# Patient Record
Sex: FEMALE | Race: WHITE | Employment: FULL TIME | URBAN - METROPOLITAN AREA
[De-identification: names, ages, dates, MRNs, and addresses within clinical notes are randomized per-mention and may not be internally consistent; named-entity substitution may affect disease eponyms.]

---

## 2017-01-01 ENCOUNTER — HOSPITAL ENCOUNTER (OUTPATIENT)
Facility: HOSPITAL | Age: 52
Discharge: HOME | End: 2017-01-01
Attending: INTERNAL MEDICINE | Admitting: INTERNAL MEDICINE
Payer: COMMERCIAL

## 2017-01-01 ENCOUNTER — HOSPITAL ENCOUNTER (INPATIENT)
Facility: HOSPITAL | Age: 52
LOS: 2 days | DRG: 189 | End: 2017-01-01
Attending: HOSPITALIST | Admitting: HOSPITALIST
Payer: OTHER MISCELLANEOUS

## 2017-01-01 ENCOUNTER — TELEPHONE (OUTPATIENT)
Dept: SURGERY | Facility: CLINIC | Age: 52
End: 2017-01-01

## 2017-01-01 ENCOUNTER — APPOINTMENT (OUTPATIENT)
Dept: CV DIAGNOSTICS | Facility: HOSPITAL | Age: 52
DRG: 853 | End: 2017-01-01
Attending: HOSPITALIST
Payer: COMMERCIAL

## 2017-01-01 ENCOUNTER — APPOINTMENT (OUTPATIENT)
Dept: MRI IMAGING | Facility: HOSPITAL | Age: 52
DRG: 853 | End: 2017-01-01
Attending: HOSPITALIST
Payer: COMMERCIAL

## 2017-01-01 ENCOUNTER — APPOINTMENT (OUTPATIENT)
Dept: ULTRASOUND IMAGING | Facility: HOSPITAL | Age: 52
DRG: 853 | End: 2017-01-01
Attending: HOSPITALIST
Payer: COMMERCIAL

## 2017-01-01 ENCOUNTER — OFFICE VISIT (OUTPATIENT)
Dept: SURGERY | Facility: CLINIC | Age: 52
End: 2017-01-01

## 2017-01-01 ENCOUNTER — HOSPITAL ENCOUNTER (INPATIENT)
Facility: HOSPITAL | Age: 52
LOS: 11 days | Discharge: NURSING, REHAB OR LONG-TERM CARE | DRG: 853 | End: 2017-01-01
Attending: HOSPITALIST | Admitting: HOSPITALIST
Payer: COMMERCIAL

## 2017-01-01 ENCOUNTER — HOSPITAL ENCOUNTER (OUTPATIENT)
Dept: GENERAL RADIOLOGY | Facility: HOSPITAL | Age: 52
Discharge: HOME | End: 2017-01-01
Attending: Other
Payer: COMMERCIAL

## 2017-01-01 ENCOUNTER — APPOINTMENT (OUTPATIENT)
Dept: GENERAL RADIOLOGY | Facility: HOSPITAL | Age: 52
DRG: 853 | End: 2017-01-01
Attending: HOSPITALIST
Payer: COMMERCIAL

## 2017-01-01 ENCOUNTER — APPOINTMENT (OUTPATIENT)
Dept: CT IMAGING | Facility: HOSPITAL | Age: 52
DRG: 853 | End: 2017-01-01
Attending: HOSPITALIST
Payer: COMMERCIAL

## 2017-01-01 ENCOUNTER — OFFICE VISIT (OUTPATIENT)
Dept: HEMATOLOGY/ONCOLOGY | Facility: HOSPITAL | Age: 52
End: 2017-01-01
Attending: INTERNAL MEDICINE
Payer: COMMERCIAL

## 2017-01-01 ENCOUNTER — NURSE ONLY (OUTPATIENT)
Dept: LAB | Facility: HOSPITAL | Age: 52
End: 2017-01-01
Attending: Other
Payer: COMMERCIAL

## 2017-01-01 ENCOUNTER — APPOINTMENT (OUTPATIENT)
Dept: INTERVENTIONAL RADIOLOGY/VASCULAR | Facility: HOSPITAL | Age: 52
DRG: 853 | End: 2017-01-01
Attending: HOSPITALIST
Payer: COMMERCIAL

## 2017-01-01 ENCOUNTER — LAB ENCOUNTER (OUTPATIENT)
Dept: LAB | Facility: HOSPITAL | Age: 52
End: 2017-01-01
Attending: INTERNAL MEDICINE
Payer: COMMERCIAL

## 2017-01-01 ENCOUNTER — HOSPITAL ENCOUNTER (INPATIENT)
Facility: HOSPITAL | Age: 52
LOS: 1 days | Discharge: HOSPICE | DRG: 189 | End: 2017-01-01
Attending: EMERGENCY MEDICINE | Admitting: HOSPITALIST
Payer: COMMERCIAL

## 2017-01-01 VITALS
SYSTOLIC BLOOD PRESSURE: 107 MMHG | HEART RATE: 54 BPM | WEIGHT: 222 LBS | DIASTOLIC BLOOD PRESSURE: 67 MMHG | HEIGHT: 66 IN | BODY MASS INDEX: 35.68 KG/M2 | RESPIRATION RATE: 16 BRPM | TEMPERATURE: 98 F

## 2017-01-01 VITALS
OXYGEN SATURATION: 97 % | TEMPERATURE: 98 F | HEIGHT: 65.75 IN | BODY MASS INDEX: 35.26 KG/M2 | RESPIRATION RATE: 18 BRPM | SYSTOLIC BLOOD PRESSURE: 124 MMHG | WEIGHT: 216.81 LBS | HEART RATE: 86 BPM | DIASTOLIC BLOOD PRESSURE: 80 MMHG

## 2017-01-01 VITALS
DIASTOLIC BLOOD PRESSURE: 73 MMHG | WEIGHT: 223.5 LBS | BODY MASS INDEX: 35.92 KG/M2 | HEART RATE: 66 BPM | TEMPERATURE: 98 F | RESPIRATION RATE: 16 BRPM | OXYGEN SATURATION: 100 % | SYSTOLIC BLOOD PRESSURE: 111 MMHG | HEIGHT: 66 IN

## 2017-01-01 VITALS
SYSTOLIC BLOOD PRESSURE: 105 MMHG | RESPIRATION RATE: 22 BRPM | DIASTOLIC BLOOD PRESSURE: 66 MMHG | HEART RATE: 122 BPM | OXYGEN SATURATION: 96 % | TEMPERATURE: 101 F

## 2017-01-01 VITALS
HEART RATE: 130 BPM | SYSTOLIC BLOOD PRESSURE: 70 MMHG | RESPIRATION RATE: 20 BRPM | OXYGEN SATURATION: 97 % | DIASTOLIC BLOOD PRESSURE: 46 MMHG | TEMPERATURE: 101 F

## 2017-01-01 VITALS
WEIGHT: 220 LBS | HEART RATE: 68 BPM | SYSTOLIC BLOOD PRESSURE: 148 MMHG | DIASTOLIC BLOOD PRESSURE: 92 MMHG | RESPIRATION RATE: 18 BRPM | HEIGHT: 66 IN | BODY MASS INDEX: 35.36 KG/M2

## 2017-01-01 VITALS
OXYGEN SATURATION: 99 % | TEMPERATURE: 96 F | DIASTOLIC BLOOD PRESSURE: 74 MMHG | HEART RATE: 53 BPM | SYSTOLIC BLOOD PRESSURE: 139 MMHG | RESPIRATION RATE: 16 BRPM

## 2017-01-01 DIAGNOSIS — R90.89 ABNORMAL BRAIN MRI: ICD-10-CM

## 2017-01-01 DIAGNOSIS — G37.9 DEMYELINATING CHANGES IN BRAIN (HCC): ICD-10-CM

## 2017-01-01 DIAGNOSIS — R41.82 ALTERED MENTAL STATUS, UNSPECIFIED ALTERED MENTAL STATUS TYPE: ICD-10-CM

## 2017-01-01 DIAGNOSIS — C34.92 NON-SMALL CELL CARCINOMA OF LUNG, LEFT (HCC): ICD-10-CM

## 2017-01-01 DIAGNOSIS — R93.89 ABNORMAL MRI: Primary | ICD-10-CM

## 2017-01-01 DIAGNOSIS — F41.9 ANXIETY: ICD-10-CM

## 2017-01-01 DIAGNOSIS — R91.8 LUNG MASS: Primary | ICD-10-CM

## 2017-01-01 DIAGNOSIS — G93.9 BRAIN LESION: ICD-10-CM

## 2017-01-01 DIAGNOSIS — C34.90 PRIMARY MALIGNANT NEOPLASM OF LUNG METASTATIC TO OTHER SITE, UNSPECIFIED LATERALITY (HCC): ICD-10-CM

## 2017-01-01 DIAGNOSIS — C79.52 SECONDARY MALIGNANT NEOPLASM OF BONE AND BONE MARROW (HCC): Primary | ICD-10-CM

## 2017-01-01 DIAGNOSIS — C79.51 SECONDARY MALIGNANT NEOPLASM OF BONE AND BONE MARROW (HCC): ICD-10-CM

## 2017-01-01 DIAGNOSIS — C79.51 BONE METASTASIS (HCC): ICD-10-CM

## 2017-01-01 DIAGNOSIS — J96.90 RESPIRATORY FAILURE, UNSPECIFIED CHRONICITY, UNSPECIFIED WHETHER WITH HYPOXIA OR HYPERCAPNIA (HCC): Primary | ICD-10-CM

## 2017-01-01 DIAGNOSIS — C79.51 SECONDARY MALIGNANT NEOPLASM OF BONE AND BONE MARROW (HCC): Primary | ICD-10-CM

## 2017-01-01 DIAGNOSIS — C34.92 SMALL CELL LUNG CANCER, LEFT (HCC): ICD-10-CM

## 2017-01-01 DIAGNOSIS — C79.52 SECONDARY MALIGNANT NEOPLASM OF BONE AND BONE MARROW (HCC): ICD-10-CM

## 2017-01-01 LAB
ALBUMIN SERPL BCP-MCNC: 2 G/DL (ref 3.5–4.8)
ALBUMIN SERPL BCP-MCNC: 2 G/DL (ref 3.5–4.8)
ALBUMIN SERPL BCP-MCNC: 2.1 G/DL (ref 3.5–4.8)
ALBUMIN SERPL BCP-MCNC: 2.2 G/DL (ref 3.5–4.8)
ALBUMIN/GLOB SERPL: 1 {RATIO} (ref 1–2)
ALBUMIN/GLOB SERPL: 1 {RATIO} (ref 1–2)
ALP SERPL-CCNC: 48 U/L (ref 32–100)
ALP SERPL-CCNC: 53 U/L (ref 32–100)
ALT SERPL-CCNC: 35 U/L (ref 14–54)
ALT SERPL-CCNC: 36 U/L (ref 14–54)
ANION GAP SERPL CALC-SCNC: 3 MMOL/L (ref 0–18)
ANION GAP SERPL CALC-SCNC: 4 MMOL/L (ref 0–18)
ANION GAP SERPL CALC-SCNC: 5 MMOL/L (ref 0–18)
ANION GAP SERPL CALC-SCNC: 5 MMOL/L (ref 0–18)
ANION GAP SERPL CALC-SCNC: 6 MMOL/L (ref 0–18)
ANION GAP SERPL CALC-SCNC: 7 MMOL/L (ref 0–18)
ANION GAP SERPL CALC-SCNC: 9 MMOL/L (ref 0–18)
AST SERPL-CCNC: 25 U/L (ref 15–41)
AST SERPL-CCNC: 27 U/L (ref 15–41)
B-HCG UR QL: NEGATIVE
BASOPHILS # BLD: 0 K/UL (ref 0–0.2)
BASOPHILS NFR BLD: 0 %
BASOPHILS NFR BLD: 1 %
BILIRUB SERPL-MCNC: 0.8 MG/DL (ref 0.3–1.2)
BILIRUB SERPL-MCNC: 1.2 MG/DL (ref 0.3–1.2)
BILIRUB UR QL: NEGATIVE
BUN SERPL-MCNC: 4 MG/DL (ref 8–20)
BUN SERPL-MCNC: 40 MG/DL (ref 8–20)
BUN SERPL-MCNC: 5 MG/DL (ref 8–20)
BUN SERPL-MCNC: 6 MG/DL (ref 8–20)
BUN SERPL-MCNC: 7 MG/DL (ref 8–20)
BUN SERPL-MCNC: 9 MG/DL (ref 8–20)
BUN/CREAT SERPL: 11.9 (ref 10–20)
BUN/CREAT SERPL: 11.9 (ref 10–20)
BUN/CREAT SERPL: 12 (ref 10–20)
BUN/CREAT SERPL: 13 (ref 10–20)
BUN/CREAT SERPL: 13.6 (ref 10–20)
BUN/CREAT SERPL: 14.8 (ref 10–20)
BUN/CREAT SERPL: 21.4 (ref 10–20)
BUN/CREAT SERPL: 21.4 (ref 10–20)
BUN/CREAT SERPL: 22.6 (ref 10–20)
BUN/CREAT SERPL: 22.6 (ref 10–20)
BUN/CREAT SERPL: 8.2 (ref 10–20)
CA-I BLD-SCNC: 0.94 MMOL/L (ref 0.95–1.32)
CALCIUM SERPL-MCNC: 5.7 MG/DL (ref 8.5–10.5)
CALCIUM SERPL-MCNC: 5.8 MG/DL (ref 8.5–10.5)
CALCIUM SERPL-MCNC: 6 MG/DL (ref 8.5–10.5)
CALCIUM SERPL-MCNC: 6 MG/DL (ref 8.5–10.5)
CALCIUM SERPL-MCNC: 6.2 MG/DL (ref 8.5–10.5)
CALCIUM SERPL-MCNC: 6.3 MG/DL (ref 8.5–10.5)
CALCIUM SERPL-MCNC: 6.3 MG/DL (ref 8.5–10.5)
CALCIUM SERPL-MCNC: 6.8 MG/DL (ref 8.5–10.5)
CALCIUM SERPL-MCNC: 6.9 MG/DL (ref 8.5–10.5)
CHLORIDE SERPL-SCNC: 106 MMOL/L (ref 95–110)
CHLORIDE SERPL-SCNC: 109 MMOL/L (ref 95–110)
CHLORIDE SERPL-SCNC: 110 MMOL/L (ref 95–110)
CHLORIDE SERPL-SCNC: 111 MMOL/L (ref 95–110)
CHLORIDE SERPL-SCNC: 112 MMOL/L (ref 95–110)
CHLORIDE SERPL-SCNC: 113 MMOL/L (ref 95–110)
CHLORIDE SERPL-SCNC: 114 MMOL/L (ref 95–110)
CHOLEST SERPL-MCNC: 123 MG/DL (ref 110–200)
CLARITY CSF: CLEAR
CO2 SERPL-SCNC: 23 MMOL/L (ref 22–32)
CO2 SERPL-SCNC: 24 MMOL/L (ref 22–32)
CO2 SERPL-SCNC: 25 MMOL/L (ref 22–32)
CO2 SERPL-SCNC: 27 MMOL/L (ref 22–32)
CO2 SERPL-SCNC: 27 MMOL/L (ref 22–32)
CO2 SERPL-SCNC: 28 MMOL/L (ref 22–32)
CO2 SERPL-SCNC: 28 MMOL/L (ref 22–32)
CO2 SERPL-SCNC: 29 MMOL/L (ref 22–32)
CO2 SERPL-SCNC: 29 MMOL/L (ref 22–32)
COLOR CSF: COLORLESS
COLOR UR: YELLOW
COUNT PERFORMED ON TUBE: 4
CREAT SERPL-MCNC: 0.28 MG/DL (ref 0.5–1.5)
CREAT SERPL-MCNC: 0.28 MG/DL (ref 0.5–1.5)
CREAT SERPL-MCNC: 0.31 MG/DL (ref 0.5–1.5)
CREAT SERPL-MCNC: 0.31 MG/DL (ref 0.5–1.5)
CREAT SERPL-MCNC: 0.42 MG/DL (ref 0.5–1.5)
CREAT SERPL-MCNC: 0.44 MG/DL (ref 0.5–1.5)
CREAT SERPL-MCNC: 0.49 MG/DL (ref 0.5–1.5)
CREAT SERPL-MCNC: 0.5 MG/DL (ref 0.5–1.5)
CREAT SERPL-MCNC: 0.59 MG/DL (ref 0.5–1.5)
CREAT SERPL-MCNC: 0.69 MG/DL (ref 0.5–1.5)
CREAT SERPL-MCNC: 2.7 MG/DL (ref 0.5–1.5)
EOSINOPHIL # BLD: 0 K/UL (ref 0–0.7)
EOSINOPHIL NFR BLD: 0 %
EOSINOPHIL NFR BLD: 1 %
EOSINOPHIL NFR BLD: 1 %
ERYTHROCYTE [DISTWIDTH] IN BLOOD BY AUTOMATED COUNT: 19.4 % (ref 11–15)
ERYTHROCYTE [DISTWIDTH] IN BLOOD BY AUTOMATED COUNT: 19.4 % (ref 11–15)
ERYTHROCYTE [DISTWIDTH] IN BLOOD BY AUTOMATED COUNT: 19.5 % (ref 11–15)
ERYTHROCYTE [DISTWIDTH] IN BLOOD BY AUTOMATED COUNT: 19.7 % (ref 11–15)
ERYTHROCYTE [DISTWIDTH] IN BLOOD BY AUTOMATED COUNT: 19.8 % (ref 11–15)
ERYTHROCYTE [DISTWIDTH] IN BLOOD BY AUTOMATED COUNT: 19.9 % (ref 11–15)
ERYTHROCYTE [DISTWIDTH] IN BLOOD BY AUTOMATED COUNT: 20 % (ref 11–15)
ERYTHROCYTE [DISTWIDTH] IN BLOOD BY AUTOMATED COUNT: 20.3 % (ref 11–15)
ERYTHROCYTE [DISTWIDTH] IN BLOOD BY AUTOMATED COUNT: 20.3 % (ref 11–15)
ERYTHROCYTE [DISTWIDTH] IN BLOOD BY AUTOMATED COUNT: 20.6 % (ref 11–15)
ERYTHROCYTE [DISTWIDTH] IN BLOOD BY AUTOMATED COUNT: 21.1 % (ref 11–15)
GLOBULIN PLAS-MCNC: 2.1 G/DL (ref 2.5–3.7)
GLOBULIN PLAS-MCNC: 2.2 G/DL (ref 2.5–3.7)
GLUCOSE SERPL-MCNC: 108 MG/DL (ref 70–99)
GLUCOSE SERPL-MCNC: 111 MG/DL (ref 70–99)
GLUCOSE SERPL-MCNC: 111 MG/DL (ref 70–99)
GLUCOSE SERPL-MCNC: 120 MG/DL (ref 70–99)
GLUCOSE SERPL-MCNC: 125 MG/DL (ref 70–99)
GLUCOSE SERPL-MCNC: 153 MG/DL (ref 70–99)
GLUCOSE SERPL-MCNC: 165 MG/DL (ref 70–99)
GLUCOSE SERPL-MCNC: 82 MG/DL (ref 70–99)
GLUCOSE SERPL-MCNC: 84 MG/DL (ref 70–99)
GLUCOSE SERPL-MCNC: 86 MG/DL (ref 70–99)
GLUCOSE SERPL-MCNC: 93 MG/DL (ref 70–99)
GLUCOSE UR-MCNC: NEGATIVE MG/DL
HCT VFR BLD AUTO: 25.8 % (ref 35–48)
HCT VFR BLD AUTO: 26 % (ref 35–48)
HCT VFR BLD AUTO: 26.3 % (ref 35–48)
HCT VFR BLD AUTO: 26.4 % (ref 35–48)
HCT VFR BLD AUTO: 26.9 % (ref 35–48)
HCT VFR BLD AUTO: 27 % (ref 35–48)
HCT VFR BLD AUTO: 27.1 % (ref 35–48)
HCT VFR BLD AUTO: 27.2 % (ref 35–48)
HCT VFR BLD AUTO: 27.3 % (ref 35–48)
HCT VFR BLD AUTO: 27.8 % (ref 35–48)
HCT VFR BLD AUTO: 28.8 % (ref 35–48)
HDLC SERPL-MCNC: 36 MG/DL
HGB BLD-MCNC: 8.2 G/DL (ref 12–16)
HGB BLD-MCNC: 8.5 G/DL (ref 12–16)
HGB BLD-MCNC: 8.7 G/DL (ref 12–16)
HGB BLD-MCNC: 8.9 G/DL (ref 12–16)
HGB BLD-MCNC: 8.9 G/DL (ref 12–16)
HGB BLD-MCNC: 9 G/DL (ref 12–16)
HGB BLD-MCNC: 9 G/DL (ref 12–16)
HGB BLD-MCNC: 9.1 G/DL (ref 12–16)
HGB BLD-MCNC: 9.1 G/DL (ref 12–16)
HGB BLD-MCNC: 9.2 G/DL (ref 12–16)
HGB BLD-MCNC: 9.4 G/DL (ref 12–16)
INR BLD: 1.2 (ref 0.9–1.2)
KETONES UR-MCNC: 80 MG/DL
LACTATE SERPL-SCNC: 1.1 MMOL/L (ref 0.5–2.2)
LDLC SERPL CALC-MCNC: 75 MG/DL (ref 0–99)
LYMPHOCYTES # BLD: 0.2 K/UL (ref 1–4)
LYMPHOCYTES # BLD: 0.2 K/UL (ref 1–4)
LYMPHOCYTES # BLD: 0.3 K/UL (ref 1–4)
LYMPHOCYTES NFR BLD: 1 %
LYMPHOCYTES NFR BLD: 3 %
LYMPHOCYTES NFR BLD: 3 %
LYMPHOCYTES NFR BLD: 4 %
LYMPHOCYTES NFR BLD: 4 %
LYMPHOCYTES NFR BLD: 5 %
MAGNESIUM SERPL-MCNC: 1.7 MG/DL (ref 1.8–2.5)
MAGNESIUM SERPL-MCNC: 1.7 MG/DL (ref 1.8–2.5)
MAGNESIUM SERPL-MCNC: 1.8 MG/DL (ref 1.8–2.5)
MAGNESIUM SERPL-MCNC: 1.8 MG/DL (ref 1.8–2.5)
MAGNESIUM SERPL-MCNC: 1.9 MG/DL (ref 1.8–2.5)
MAGNESIUM SERPL-MCNC: 2 MG/DL (ref 1.8–2.5)
MAGNESIUM SERPL-MCNC: 2 MG/DL (ref 1.8–2.5)
MCH RBC QN AUTO: 29 PG (ref 27–32)
MCH RBC QN AUTO: 29 PG (ref 27–32)
MCH RBC QN AUTO: 29.2 PG (ref 27–32)
MCH RBC QN AUTO: 29.3 PG (ref 27–32)
MCH RBC QN AUTO: 29.4 PG (ref 27–32)
MCH RBC QN AUTO: 29.4 PG (ref 27–32)
MCH RBC QN AUTO: 29.5 PG (ref 27–32)
MCH RBC QN AUTO: 29.6 PG (ref 27–32)
MCH RBC QN AUTO: 29.6 PG (ref 27–32)
MCH RBC QN AUTO: 29.8 PG (ref 27–32)
MCH RBC QN AUTO: 30 PG (ref 27–32)
MCHC RBC AUTO-ENTMCNC: 31.2 G/DL (ref 32–37)
MCHC RBC AUTO-ENTMCNC: 32.6 G/DL (ref 32–37)
MCHC RBC AUTO-ENTMCNC: 32.9 G/DL (ref 32–37)
MCHC RBC AUTO-ENTMCNC: 33 G/DL (ref 32–37)
MCHC RBC AUTO-ENTMCNC: 33.2 G/DL (ref 32–37)
MCHC RBC AUTO-ENTMCNC: 33.5 G/DL (ref 32–37)
MCHC RBC AUTO-ENTMCNC: 33.8 G/DL (ref 32–37)
MCHC RBC AUTO-ENTMCNC: 33.8 G/DL (ref 32–37)
MCHC RBC AUTO-ENTMCNC: 33.9 G/DL (ref 32–37)
MCV RBC AUTO: 87 FL (ref 80–100)
MCV RBC AUTO: 87.3 FL (ref 80–100)
MCV RBC AUTO: 88.1 FL (ref 80–100)
MCV RBC AUTO: 88.2 FL (ref 80–100)
MCV RBC AUTO: 88.2 FL (ref 80–100)
MCV RBC AUTO: 88.3 FL (ref 80–100)
MCV RBC AUTO: 88.6 FL (ref 80–100)
MCV RBC AUTO: 89.6 FL (ref 80–100)
MCV RBC AUTO: 89.7 FL (ref 80–100)
MCV RBC AUTO: 89.7 FL (ref 80–100)
MCV RBC AUTO: 92.9 FL (ref 80–100)
MONOCYTES # BLD: 0.3 K/UL (ref 0–1)
MONOCYTES # BLD: 0.4 K/UL (ref 0–1)
MONOCYTES # BLD: 0.4 K/UL (ref 0–1)
MONOCYTES # BLD: 0.5 K/UL (ref 0–1)
MONOCYTES # BLD: 0.5 K/UL (ref 0–1)
MONOCYTES # BLD: 0.6 K/UL (ref 0–1)
MONOCYTES NFR BLD: 4 %
MONOCYTES NFR BLD: 5 %
MONOCYTES NFR BLD: 5 %
MONOCYTES NFR BLD: 6 %
MONOCYTES NFR BLD: 6 %
MONOCYTES NFR BLD: 7 %
MRSA DNA SPEC QL NAA+PROBE: NEGATIVE
MRSA DNA SPEC QL NAA+PROBE: NEGATIVE
NEUTROPHILS # BLD AUTO: 10.7 K/UL (ref 1.8–7.7)
NEUTROPHILS # BLD AUTO: 5.3 K/UL (ref 1.8–7.7)
NEUTROPHILS # BLD AUTO: 6.7 K/UL (ref 1.8–7.7)
NEUTROPHILS # BLD AUTO: 6.8 K/UL (ref 1.8–7.7)
NEUTROPHILS # BLD AUTO: 8.3 K/UL (ref 1.8–7.7)
NEUTROPHILS # BLD AUTO: 9.2 K/UL (ref 1.8–7.7)
NEUTROPHILS NFR BLD: 87 %
NEUTROPHILS NFR BLD: 89 %
NEUTROPHILS NFR BLD: 91 %
NEUTROPHILS NFR BLD: 91 %
NEUTROPHILS NFR BLD: 92 %
NEUTROPHILS NFR BLD: 94 %
NITRITE UR QL STRIP.AUTO: NEGATIVE
NON GYNE INTERPRETATION: NEGATIVE
NONHDLC SERPL-MCNC: 87 MG/DL
OSMOLALITY UR CALC.SUM OF ELEC: 281 MOSM/KG (ref 275–295)
OSMOLALITY UR CALC.SUM OF ELEC: 285 MOSM/KG (ref 275–295)
OSMOLALITY UR CALC.SUM OF ELEC: 290 MOSM/KG (ref 275–295)
OSMOLALITY UR CALC.SUM OF ELEC: 291 MOSM/KG (ref 275–295)
OSMOLALITY UR CALC.SUM OF ELEC: 293 MOSM/KG (ref 275–295)
OSMOLALITY UR CALC.SUM OF ELEC: 295 MOSM/KG (ref 275–295)
OSMOLALITY UR CALC.SUM OF ELEC: 296 MOSM/KG (ref 275–295)
OSMOLALITY UR CALC.SUM OF ELEC: 297 MOSM/KG (ref 275–295)
OSMOLALITY UR CALC.SUM OF ELEC: 297 MOSM/KG (ref 275–295)
OSMOLALITY UR CALC.SUM OF ELEC: 302 MOSM/KG (ref 275–295)
OSMOLALITY UR CALC.SUM OF ELEC: 317 MOSM/KG (ref 275–295)
PH UR: 6 [PH] (ref 5–8)
PLATELET # BLD AUTO: 102 K/UL (ref 140–400)
PLATELET # BLD AUTO: 58 K/UL (ref 140–400)
PLATELET # BLD AUTO: 60 K/UL (ref 140–400)
PLATELET # BLD AUTO: 64 K/UL (ref 140–400)
PLATELET # BLD AUTO: 66 K/UL (ref 140–400)
PLATELET # BLD AUTO: 66 K/UL (ref 140–400)
PLATELET # BLD AUTO: 70 K/UL (ref 140–400)
PLATELET # BLD AUTO: 75 K/UL (ref 140–400)
PMV BLD AUTO: 7.4 FL (ref 7.4–10.3)
PMV BLD AUTO: 7.6 FL (ref 7.4–10.3)
PMV BLD AUTO: 7.9 FL (ref 7.4–10.3)
PMV BLD AUTO: 8.2 FL (ref 7.4–10.3)
PMV BLD AUTO: 8.2 FL (ref 7.4–10.3)
PMV BLD AUTO: 8.6 FL (ref 7.4–10.3)
PMV BLD AUTO: 8.7 FL (ref 7.4–10.3)
PMV BLD AUTO: 9.8 FL (ref 7.4–10.3)
PMV BLD AUTO: 9.8 FL (ref 7.4–10.3)
POTASSIUM SERPL-SCNC: 2.8 MMOL/L (ref 3.3–5.1)
POTASSIUM SERPL-SCNC: 3 MMOL/L (ref 3.3–5.1)
POTASSIUM SERPL-SCNC: 3.1 MMOL/L (ref 3.3–5.1)
POTASSIUM SERPL-SCNC: 3.2 MMOL/L (ref 3.3–5.1)
POTASSIUM SERPL-SCNC: 3.2 MMOL/L (ref 3.3–5.1)
POTASSIUM SERPL-SCNC: 3.5 MMOL/L (ref 3.3–5.1)
POTASSIUM SERPL-SCNC: 3.6 MMOL/L (ref 3.3–5.1)
POTASSIUM SERPL-SCNC: 3.6 MMOL/L (ref 3.3–5.1)
POTASSIUM SERPL-SCNC: 3.7 MMOL/L (ref 3.3–5.1)
POTASSIUM SERPL-SCNC: 3.8 MMOL/L (ref 3.3–5.1)
POTASSIUM SERPL-SCNC: 4 MMOL/L (ref 3.3–5.1)
POTASSIUM SERPL-SCNC: 4.1 MMOL/L (ref 3.3–5.1)
POTASSIUM SERPL-SCNC: 4.5 MMOL/L (ref 3.3–5.1)
PROCALCITONIN SERPL-MCNC: 0.16 NG/ML (ref ?–0.11)
PROT SERPL-MCNC: 4.2 G/DL (ref 5.9–8.4)
PROT SERPL-MCNC: 4.4 G/DL (ref 5.9–8.4)
PROT UR-MCNC: NEGATIVE MG/DL
PROTHROMBIN TIME: 14.3 SECONDS (ref 11.8–14.5)
PUNCTURE CHARGE: NO
RBC # BLD AUTO: 2.83 M/UL (ref 3.7–5.4)
RBC # BLD AUTO: 2.88 M/UL (ref 3.7–5.4)
RBC # BLD AUTO: 2.94 M/UL (ref 3.7–5.4)
RBC # BLD AUTO: 3 M/UL (ref 3.7–5.4)
RBC # BLD AUTO: 3.05 M/UL (ref 3.7–5.4)
RBC # BLD AUTO: 3.05 M/UL (ref 3.7–5.4)
RBC # BLD AUTO: 3.07 M/UL (ref 3.7–5.4)
RBC # BLD AUTO: 3.08 M/UL (ref 3.7–5.4)
RBC # BLD AUTO: 3.11 M/UL (ref 3.7–5.4)
RBC # BLD AUTO: 3.15 M/UL (ref 3.7–5.4)
RBC # BLD AUTO: 3.21 M/UL (ref 3.7–5.4)
RBC #/AREA URNS AUTO: 23 /HPF
RBC CSF: 10 /MM3
SODIUM SERPL-SCNC: 137 MMOL/L (ref 136–144)
SODIUM SERPL-SCNC: 139 MMOL/L (ref 136–144)
SODIUM SERPL-SCNC: 141 MMOL/L (ref 136–144)
SODIUM SERPL-SCNC: 142 MMOL/L (ref 136–144)
SODIUM SERPL-SCNC: 143 MMOL/L (ref 136–144)
SODIUM SERPL-SCNC: 143 MMOL/L (ref 136–144)
SODIUM SERPL-SCNC: 144 MMOL/L (ref 136–144)
SODIUM SERPL-SCNC: 144 MMOL/L (ref 136–144)
SODIUM SERPL-SCNC: 145 MMOL/L (ref 136–144)
SODIUM SERPL-SCNC: 145 MMOL/L (ref 136–144)
SODIUM SERPL-SCNC: 147 MMOL/L (ref 136–144)
SP GR UR STRIP: 1.01 (ref 1–1.03)
TOTAL VOLUME CSF: 14 ML
TRIGL SERPL-MCNC: 60 MG/DL (ref 1–149)
TROPONIN I SERPL-MCNC: 0.24 NG/ML (ref ?–0.03)
TROPONIN I SERPL-MCNC: 0.35 NG/ML (ref ?–0.03)
UROBILINOGEN UR STRIP-ACNC: <2
VANCOMYCIN SERPL-MCNC: 13 MCG/ML
VANCOMYCIN TROUGH SERPL-MCNC: 10.2 MCG/ML (ref 10–20)
VANCOMYCIN TROUGH SERPL-MCNC: 10.8 MCG/ML (ref 10–20)
VANCOMYCIN TROUGH SERPL-MCNC: 19.1 MCG/ML (ref 10–20)
VANCOMYCIN TROUGH SERPL-MCNC: 35.2 MCG/ML (ref 10–20)
VANCOMYCIN TROUGH SERPL-MCNC: 39.2 MCG/ML (ref 10–20)
VIT C UR-MCNC: NEGATIVE MG/DL
WBC # BLD AUTO: 10.2 K/UL (ref 4–11)
WBC # BLD AUTO: 10.3 K/UL (ref 4–11)
WBC # BLD AUTO: 11.4 K/UL (ref 4–11)
WBC # BLD AUTO: 6.1 K/UL (ref 4–11)
WBC # BLD AUTO: 6.1 K/UL (ref 4–11)
WBC # BLD AUTO: 7.4 K/UL (ref 4–11)
WBC # BLD AUTO: 7.4 K/UL (ref 4–11)
WBC # BLD AUTO: 7.6 K/UL (ref 4–11)
WBC # BLD AUTO: 8.2 K/UL (ref 4–11)
WBC # BLD AUTO: 9 K/UL (ref 4–11)
WBC # BLD AUTO: 9.6 K/UL (ref 4–11)
WBC # FLD MANUAL: 1 /MM3 (ref 0–5)
WBC #/AREA URNS AUTO: 30 /HPF

## 2017-01-01 PROCEDURE — 71010 XR CHEST AP PORTABLE  (CPT=71010): CPT | Performed by: HOSPITALIST

## 2017-01-01 PROCEDURE — 82784 ASSAY IGA/IGD/IGG/IGM EACH: CPT

## 2017-01-01 PROCEDURE — 88108 CYTOPATH CONCENTRATE TECH: CPT | Performed by: INTERNAL MEDICINE

## 2017-01-01 PROCEDURE — 99231 SBSQ HOSP IP/OBS SF/LOW 25: CPT | Performed by: REGISTERED NURSE

## 2017-01-01 PROCEDURE — 86920 COMPATIBILITY TEST SPIN: CPT

## 2017-01-01 PROCEDURE — 99203 OFFICE O/P NEW LOW 30 MIN: CPT | Performed by: PHYSICIAN ASSISTANT

## 2017-01-01 PROCEDURE — 36415 COLL VENOUS BLD VENIPUNCTURE: CPT

## 2017-01-01 PROCEDURE — 74177 CT ABD & PELVIS W/CONTRAST: CPT | Performed by: HOSPITALIST

## 2017-01-01 PROCEDURE — 86900 BLOOD TYPING SEROLOGIC ABO: CPT | Performed by: PHYSICIAN ASSISTANT

## 2017-01-01 PROCEDURE — 99254 IP/OBS CNSLTJ NEW/EST MOD 60: CPT | Performed by: REGISTERED NURSE

## 2017-01-01 PROCEDURE — B5191ZZ FLUOROSCOPY OF INFERIOR VENA CAVA USING LOW OSMOLAR CONTRAST: ICD-10-PCS | Performed by: RADIOLOGY

## 2017-01-01 PROCEDURE — 93970 EXTREMITY STUDY: CPT | Performed by: HOSPITALIST

## 2017-01-01 PROCEDURE — 86403 PARTICLE AGGLUT ANTBDY SCRN: CPT

## 2017-01-01 PROCEDURE — 86901 BLOOD TYPING SEROLOGIC RH(D): CPT

## 2017-01-01 PROCEDURE — 99252 IP/OBS CONSLTJ NEW/EST SF 35: CPT | Performed by: REGISTERED NURSE

## 2017-01-01 PROCEDURE — 84588 ASSAY OF VASOPRESSIN: CPT | Performed by: PHYSICIAN ASSISTANT

## 2017-01-01 PROCEDURE — 86850 RBC ANTIBODY SCREEN: CPT | Performed by: PHYSICIAN ASSISTANT

## 2017-01-01 PROCEDURE — 86900 BLOOD TYPING SEROLOGIC ABO: CPT

## 2017-01-01 PROCEDURE — 99231 SBSQ HOSP IP/OBS SF/LOW 25: CPT | Performed by: NURSE PRACTITIONER

## 2017-01-01 PROCEDURE — 88185 FLOWCYTOMETRY/TC ADD-ON: CPT | Performed by: OTHER

## 2017-01-01 PROCEDURE — 93306 TTE W/DOPPLER COMPLETE: CPT | Performed by: HOSPITALIST

## 2017-01-01 PROCEDURE — 99233 SBSQ HOSP IP/OBS HIGH 50: CPT | Performed by: REGISTERED NURSE

## 2017-01-01 PROCEDURE — 83916 OLIGOCLONAL BANDS: CPT

## 2017-01-01 PROCEDURE — 30243N1 TRANSFUSION OF NONAUTOLOGOUS RED BLOOD CELLS INTO CENTRAL VEIN, PERCUTANEOUS APPROACH: ICD-10-PCS | Performed by: INTERNAL MEDICINE

## 2017-01-01 PROCEDURE — 88184 FLOWCYTOMETRY/ TC 1 MARKER: CPT | Performed by: OTHER

## 2017-01-01 PROCEDURE — 70553 MRI BRAIN STEM W/O & W/DYE: CPT | Performed by: HOSPITALIST

## 2017-01-01 PROCEDURE — 06H03DZ INSERTION OF INTRALUMINAL DEVICE INTO INFERIOR VENA CAVA, PERCUTANEOUS APPROACH: ICD-10-PCS | Performed by: RADIOLOGY

## 2017-01-01 PROCEDURE — 85025 COMPLETE CBC W/AUTO DIFF WBC: CPT

## 2017-01-01 PROCEDURE — 80053 COMPREHEN METABOLIC PANEL: CPT

## 2017-01-01 PROCEDURE — 87102 FUNGUS ISOLATION CULTURE: CPT

## 2017-01-01 PROCEDURE — 87205 SMEAR GRAM STAIN: CPT

## 2017-01-01 PROCEDURE — 99232 SBSQ HOSP IP/OBS MODERATE 35: CPT | Performed by: REGISTERED NURSE

## 2017-01-01 PROCEDURE — 62270 DX LMBR SPI PNXR: CPT | Performed by: OTHER

## 2017-01-01 PROCEDURE — 36430 TRANSFUSION BLD/BLD COMPNT: CPT

## 2017-01-01 PROCEDURE — 87070 CULTURE OTHR SPECIMN AEROBIC: CPT

## 2017-01-01 PROCEDURE — 86850 RBC ANTIBODY SCREEN: CPT

## 2017-01-01 PROCEDURE — 86901 BLOOD TYPING SEROLOGIC RH(D): CPT | Performed by: PHYSICIAN ASSISTANT

## 2017-01-01 PROCEDURE — 77003 FLUOROGUIDE FOR SPINE INJECT: CPT | Performed by: OTHER

## 2017-01-01 PROCEDURE — 89050 BODY FLUID CELL COUNT: CPT | Performed by: OTHER

## 2017-01-01 RX ORDER — POTASSIUM CHLORIDE 29.8 MG/ML
40 INJECTION INTRAVENOUS ONCE
Status: COMPLETED | OUTPATIENT
Start: 2017-01-01 | End: 2017-01-01

## 2017-01-01 RX ORDER — POLYETHYLENE GLYCOL 3350 17 G/17G
17 POWDER, FOR SOLUTION ORAL DAILY PRN
Status: DISCONTINUED | OUTPATIENT
Start: 2017-01-01 | End: 2017-01-01

## 2017-01-01 RX ORDER — MORPHINE SULFATE 2 MG/ML
2 INJECTION, SOLUTION INTRAMUSCULAR; INTRAVENOUS EVERY 2 HOUR PRN
Status: DISCONTINUED | OUTPATIENT
Start: 2017-01-01 | End: 2017-01-01

## 2017-01-01 RX ORDER — MORPHINE SULFATE IN 0.9 % NACL 1 MG/ML
1 PLASTIC BAG, INJECTION (ML) INTRAVENOUS CONTINUOUS
Status: DISCONTINUED | OUTPATIENT
Start: 2017-01-01 | End: 2017-01-01

## 2017-01-01 RX ORDER — LORAZEPAM 2 MG/ML
0.5 INJECTION INTRAMUSCULAR EVERY 4 HOURS PRN
Status: CANCELLED | OUTPATIENT
Start: 2017-01-01

## 2017-01-01 RX ORDER — MORPHINE SULFATE 100 MG/5ML
5 SOLUTION ORAL EVERY 4 HOURS PRN
Status: DISCONTINUED | OUTPATIENT
Start: 2017-01-01 | End: 2017-01-01

## 2017-01-01 RX ORDER — FUROSEMIDE 10 MG/ML
40 INJECTION INTRAMUSCULAR; INTRAVENOUS EVERY 8 HOURS PRN
Status: DISCONTINUED | OUTPATIENT
Start: 2017-01-01 | End: 2017-01-01

## 2017-01-01 RX ORDER — SODIUM CHLORIDE 0.9 % (FLUSH) 0.9 %
3 SYRINGE (ML) INJECTION AS NEEDED
Status: DISCONTINUED | OUTPATIENT
Start: 2017-01-01 | End: 2017-01-01

## 2017-01-01 RX ORDER — LORAZEPAM 2 MG/ML
1 INJECTION INTRAMUSCULAR EVERY 4 HOURS PRN
Status: DISCONTINUED | OUTPATIENT
Start: 2017-01-01 | End: 2017-01-01

## 2017-01-01 RX ORDER — NYSTATIN 100000 U/G
CREAM TOPICAL
Qty: 1 TUBE | Refills: 0 | Status: SHIPPED | OUTPATIENT
Start: 2017-01-01

## 2017-01-01 RX ORDER — LEVETIRACETAM 500 MG/1
500 TABLET ORAL 2 TIMES DAILY
Qty: 60 TABLET | Refills: 0 | Status: SHIPPED | OUTPATIENT
Start: 2017-01-01

## 2017-01-01 RX ORDER — 0.9 % SODIUM CHLORIDE 0.9 %
VIAL (ML) INJECTION
Status: DISCONTINUED
Start: 2017-01-01 | End: 2017-01-01

## 2017-01-01 RX ORDER — ACETAMINOPHEN 325 MG/1
650 TABLET ORAL EVERY 6 HOURS PRN
Qty: 1 TABLET | Refills: 0 | Status: SHIPPED | OUTPATIENT
Start: 2017-01-01

## 2017-01-01 RX ORDER — POTASSIUM CHLORIDE 29.8 MG/ML
40 INJECTION INTRAVENOUS EVERY 4 HOURS
Status: DISPENSED | OUTPATIENT
Start: 2017-01-01 | End: 2017-01-01

## 2017-01-01 RX ORDER — SODIUM CHLORIDE 0.9 % (FLUSH) 0.9 %
10 SYRINGE (ML) INJECTION AS NEEDED
Status: DISCONTINUED | OUTPATIENT
Start: 2017-01-01 | End: 2017-01-01

## 2017-01-01 RX ORDER — SCOLOPAMINE TRANSDERMAL SYSTEM 1 MG/1
1 PATCH, EXTENDED RELEASE TRANSDERMAL
Status: CANCELLED | OUTPATIENT
Start: 2017-09-03

## 2017-01-01 RX ORDER — SODIUM CHLORIDE 9 MG/ML
INJECTION, SOLUTION INTRAVENOUS
Status: COMPLETED
Start: 2017-01-01 | End: 2017-01-01

## 2017-01-01 RX ORDER — 0.9 % SODIUM CHLORIDE 0.9 %
VIAL (ML) INJECTION
Status: DISPENSED
Start: 2017-01-01 | End: 2017-01-01

## 2017-01-01 RX ORDER — MAGNESIUM SULFATE HEPTAHYDRATE 40 MG/ML
2 INJECTION, SOLUTION INTRAVENOUS ONCE
Status: COMPLETED | OUTPATIENT
Start: 2017-01-01 | End: 2017-01-01

## 2017-01-01 RX ORDER — GLYCOPYRROLATE 0.2 MG/ML
0.4 INJECTION, SOLUTION INTRAMUSCULAR; INTRAVENOUS
Status: DISCONTINUED | OUTPATIENT
Start: 2017-01-01 | End: 2017-01-01

## 2017-01-01 RX ORDER — LORAZEPAM 2 MG/ML
2 INJECTION INTRAMUSCULAR EVERY 4 HOURS PRN
Status: DISCONTINUED | OUTPATIENT
Start: 2017-01-01 | End: 2017-01-01

## 2017-01-01 RX ORDER — FUROSEMIDE 10 MG/ML
20 INJECTION INTRAMUSCULAR; INTRAVENOUS ONCE
Status: COMPLETED | OUTPATIENT
Start: 2017-01-01 | End: 2017-01-01

## 2017-01-01 RX ORDER — ACETAMINOPHEN 160 MG/5ML
650 SOLUTION ORAL EVERY 6 HOURS SCHEDULED
Status: DISCONTINUED | OUTPATIENT
Start: 2017-01-01 | End: 2017-01-01

## 2017-01-01 RX ORDER — POTASSIUM CHLORIDE 29.8 MG/ML
40 INJECTION INTRAVENOUS EVERY 4 HOURS
Status: COMPLETED | OUTPATIENT
Start: 2017-01-01 | End: 2017-01-01

## 2017-01-01 RX ORDER — HEPARIN SODIUM 5000 [USP'U]/ML
5000 INJECTION, SOLUTION INTRAVENOUS; SUBCUTANEOUS EVERY 8 HOURS SCHEDULED
Status: DISCONTINUED | OUTPATIENT
Start: 2017-01-01 | End: 2017-01-01

## 2017-01-01 RX ORDER — SODIUM CHLORIDE 9 MG/ML
INJECTION, SOLUTION INTRAVENOUS CONTINUOUS
Status: DISCONTINUED | OUTPATIENT
Start: 2017-01-01 | End: 2017-01-01

## 2017-01-01 RX ORDER — SCOLOPAMINE TRANSDERMAL SYSTEM 1 MG/1
1 PATCH, EXTENDED RELEASE TRANSDERMAL
Status: DISCONTINUED | OUTPATIENT
Start: 2017-01-01 | End: 2017-01-01

## 2017-01-01 RX ORDER — IPRATROPIUM BROMIDE AND ALBUTEROL SULFATE 2.5; .5 MG/3ML; MG/3ML
SOLUTION RESPIRATORY (INHALATION)
Status: COMPLETED
Start: 2017-01-01 | End: 2017-01-01

## 2017-01-01 RX ORDER — BISACODYL 10 MG
10 SUPPOSITORY, RECTAL RECTAL
Status: DISCONTINUED | OUTPATIENT
Start: 2017-01-01 | End: 2017-01-01

## 2017-01-01 RX ORDER — KETOROLAC TROMETHAMINE 30 MG/ML
30 INJECTION, SOLUTION INTRAMUSCULAR; INTRAVENOUS EVERY 6 HOURS PRN
Status: DISPENSED | OUTPATIENT
Start: 2017-01-01 | End: 2017-01-01

## 2017-01-01 RX ORDER — TRAMADOL HYDROCHLORIDE 50 MG/1
50 TABLET ORAL EVERY 6 HOURS PRN
Status: DISCONTINUED | OUTPATIENT
Start: 2017-01-01 | End: 2017-01-01

## 2017-01-01 RX ORDER — POTASSIUM CHLORIDE 29.8 MG/ML
40 INJECTION INTRAVENOUS ONCE
Status: DISCONTINUED | OUTPATIENT
Start: 2017-01-01 | End: 2017-01-01

## 2017-01-01 RX ORDER — MORPHINE SULFATE 15 MG/1
15 TABLET, FILM COATED, EXTENDED RELEASE ORAL EVERY 12 HOURS SCHEDULED
Qty: 30 TABLET | Refills: 0 | Status: SHIPPED | OUTPATIENT
Start: 2017-01-01

## 2017-01-01 RX ORDER — DIPHENHYDRAMINE HCL 25 MG
CAPSULE ORAL
Status: DISCONTINUED
Start: 2017-01-01 | End: 2017-01-01

## 2017-01-01 RX ORDER — DOCUSATE SODIUM 100 MG/1
100 CAPSULE, LIQUID FILLED ORAL 2 TIMES DAILY
Status: DISCONTINUED | OUTPATIENT
Start: 2017-01-01 | End: 2017-01-01

## 2017-01-01 RX ORDER — SODIUM CHLORIDE 9 MG/ML
INJECTION, SOLUTION INTRAVENOUS
Status: DISPENSED
Start: 2017-01-01 | End: 2017-01-01

## 2017-01-01 RX ORDER — SODIUM PHOSPHATE, DIBASIC AND SODIUM PHOSPHATE, MONOBASIC 7; 19 G/133ML; G/133ML
1 ENEMA RECTAL ONCE AS NEEDED
Status: DISCONTINUED | OUTPATIENT
Start: 2017-01-01 | End: 2017-01-01

## 2017-01-01 RX ORDER — NYSTATIN 100000 U/G
CREAM TOPICAL 2 TIMES DAILY
Status: DISCONTINUED | OUTPATIENT
Start: 2017-01-01 | End: 2017-01-01

## 2017-01-01 RX ORDER — DIPHENHYDRAMINE HCL 25 MG
25 CAPSULE ORAL ONCE
Status: COMPLETED | OUTPATIENT
Start: 2017-01-01 | End: 2017-01-01

## 2017-01-01 RX ORDER — ASPIRIN 325 MG
325 TABLET, DELAYED RELEASE (ENTERIC COATED) ORAL DAILY
Status: DISCONTINUED | OUTPATIENT
Start: 2017-01-01 | End: 2017-01-01

## 2017-01-01 RX ORDER — SODIUM CHLORIDE AND POTASSIUM CHLORIDE .9; .15 G/100ML; G/100ML
SOLUTION INTRAVENOUS CONTINUOUS
Status: DISCONTINUED | OUTPATIENT
Start: 2017-01-01 | End: 2017-01-01

## 2017-01-01 RX ORDER — MORPHINE SULFATE 100 MG/5ML
5 SOLUTION ORAL EVERY 4 HOURS PRN
Qty: 1 BOTTLE | Refills: 0 | Status: SHIPPED | OUTPATIENT
Start: 2017-01-01

## 2017-01-01 RX ORDER — ONDANSETRON 2 MG/ML
4 INJECTION INTRAMUSCULAR; INTRAVENOUS EVERY 6 HOURS PRN
Status: DISCONTINUED | OUTPATIENT
Start: 2017-01-01 | End: 2017-01-01

## 2017-01-01 RX ORDER — LEVOFLOXACIN 750 MG/1
750 TABLET ORAL DAILY
Qty: 7 TABLET | Refills: 0 | Status: SHIPPED | OUTPATIENT
Start: 2017-01-01 | End: 2017-01-01

## 2017-01-01 RX ORDER — SODIUM CHLORIDE 0.9 % (FLUSH) 0.9 %
10 SYRINGE (ML) INJECTION AS NEEDED
Status: CANCELLED | OUTPATIENT
Start: 2017-01-01

## 2017-01-01 RX ORDER — MORPHINE SULFATE 2 MG/ML
1 INJECTION, SOLUTION INTRAMUSCULAR; INTRAVENOUS EVERY 2 HOUR PRN
Status: DISCONTINUED | OUTPATIENT
Start: 2017-01-01 | End: 2017-01-01

## 2017-01-01 RX ORDER — HEPARIN SODIUM (PORCINE) LOCK FLUSH IV SOLN 100 UNIT/ML 100 UNIT/ML
SOLUTION INTRAVENOUS
Status: DISCONTINUED
Start: 2017-01-01 | End: 2017-01-01

## 2017-01-01 RX ORDER — LORAZEPAM 2 MG/ML
0.5 INJECTION INTRAMUSCULAR EVERY 4 HOURS PRN
Status: DISCONTINUED | OUTPATIENT
Start: 2017-01-01 | End: 2017-01-01

## 2017-01-01 RX ORDER — SENNOSIDES 8.6 MG
8.6 TABLET ORAL 2 TIMES DAILY PRN
Status: DISCONTINUED | OUTPATIENT
Start: 2017-01-01 | End: 2017-01-01

## 2017-01-01 RX ORDER — ACETAMINOPHEN 325 MG/1
650 TABLET ORAL EVERY 6 HOURS PRN
Status: DISCONTINUED | OUTPATIENT
Start: 2017-01-01 | End: 2017-01-01

## 2017-01-01 RX ORDER — LIDOCAINE HYDROCHLORIDE 20 MG/ML
INJECTION, SOLUTION EPIDURAL; INFILTRATION; INTRACAUDAL; PERINEURAL
Status: COMPLETED
Start: 2017-01-01 | End: 2017-01-01

## 2017-01-01 RX ORDER — MORPHINE SULFATE 15 MG/1
15 TABLET, FILM COATED, EXTENDED RELEASE ORAL EVERY 12 HOURS SCHEDULED
Status: DISCONTINUED | OUTPATIENT
Start: 2017-01-01 | End: 2017-01-01

## 2017-01-01 RX ORDER — MORPHINE SULFATE IN 0.9 % NACL 1 MG/ML
1 PLASTIC BAG, INJECTION (ML) INTRAVENOUS CONTINUOUS
Status: CANCELLED | OUTPATIENT
Start: 2017-01-01

## 2017-01-01 RX ORDER — MORPHINE SULFATE 4 MG/ML
4 INJECTION, SOLUTION INTRAMUSCULAR; INTRAVENOUS EVERY 2 HOUR PRN
Status: DISCONTINUED | OUTPATIENT
Start: 2017-01-01 | End: 2017-01-01

## 2017-01-01 RX ORDER — ACETAMINOPHEN 325 MG/1
650 TABLET ORAL ONCE
Status: COMPLETED | OUTPATIENT
Start: 2017-01-01 | End: 2017-01-01

## 2017-01-01 RX ORDER — TRAMADOL HYDROCHLORIDE 50 MG/1
50 TABLET ORAL EVERY 6 HOURS PRN
Qty: 40 TABLET | Refills: 0 | Status: SHIPPED | OUTPATIENT
Start: 2017-01-01 | End: 2017-01-01

## 2017-01-01 RX ORDER — ATROPINE SULFATE 10 MG/ML
2 SOLUTION/ DROPS OPHTHALMIC EVERY 2 HOUR PRN
Status: CANCELLED | OUTPATIENT
Start: 2017-01-01

## 2017-01-01 RX ORDER — GLYCOPYRROLATE 0.2 MG/ML
0.4 INJECTION, SOLUTION INTRAMUSCULAR; INTRAVENOUS
Status: CANCELLED | OUTPATIENT
Start: 2017-01-01

## 2017-01-01 RX ORDER — SODIUM CHLORIDE 9 MG/ML
INJECTION, SOLUTION INTRAVENOUS ONCE
Status: DISCONTINUED | OUTPATIENT
Start: 2017-01-01 | End: 2017-01-01

## 2017-01-01 RX ORDER — LORAZEPAM 2 MG/ML
2 INJECTION INTRAMUSCULAR EVERY 4 HOURS PRN
Status: CANCELLED | OUTPATIENT
Start: 2017-01-01

## 2017-01-01 RX ORDER — MORPHINE SULFATE 2 MG/ML
2 INJECTION, SOLUTION INTRAMUSCULAR; INTRAVENOUS
Status: DISCONTINUED | OUTPATIENT
Start: 2017-01-01 | End: 2017-01-01

## 2017-01-01 RX ORDER — POTASSIUM CHLORIDE 14.9 MG/ML
20 INJECTION INTRAVENOUS ONCE
Status: COMPLETED | OUTPATIENT
Start: 2017-01-01 | End: 2017-01-01

## 2017-01-01 RX ORDER — LIDOCAINE AND PRILOCAINE 25; 25 MG/G; MG/G
CREAM TOPICAL ONCE
Status: DISCONTINUED | OUTPATIENT
Start: 2017-01-01 | End: 2017-01-01

## 2017-01-01 RX ORDER — ALPRAZOLAM 0.5 MG/1
0.5 TABLET ORAL EVERY 4 HOURS PRN
Qty: 20 TABLET | Refills: 0 | Status: SHIPPED | OUTPATIENT
Start: 2017-01-01

## 2017-01-01 RX ORDER — MORPHINE SULFATE 2 MG/ML
2 INJECTION, SOLUTION INTRAMUSCULAR; INTRAVENOUS ONCE
Status: COMPLETED | OUTPATIENT
Start: 2017-01-01 | End: 2017-01-01

## 2017-01-01 RX ORDER — FUROSEMIDE 40 MG/1
40 TABLET ORAL EVERY 8 HOURS PRN
Status: DISCONTINUED | OUTPATIENT
Start: 2017-01-01 | End: 2017-01-01

## 2017-01-01 RX ORDER — ATROPINE SULFATE 10 MG/ML
2 SOLUTION/ DROPS OPHTHALMIC EVERY 2 HOUR PRN
Status: DISCONTINUED | OUTPATIENT
Start: 2017-01-01 | End: 2017-01-01

## 2017-01-01 RX ORDER — SCOLOPAMINE TRANSDERMAL SYSTEM 1 MG/1
1 PATCH, EXTENDED RELEASE TRANSDERMAL
Status: DISCONTINUED | OUTPATIENT
Start: 2017-09-03 | End: 2017-01-01

## 2017-01-01 RX ORDER — MORPHINE SULFATE 2 MG/ML
2 INJECTION, SOLUTION INTRAMUSCULAR; INTRAVENOUS
Status: CANCELLED | OUTPATIENT
Start: 2017-01-01

## 2017-01-01 RX ORDER — SODIUM CHLORIDE 9 MG/ML
INJECTION, SOLUTION INTRAVENOUS ONCE
Status: COMPLETED | OUTPATIENT
Start: 2017-01-01 | End: 2017-01-01

## 2017-01-01 RX ORDER — ACETAMINOPHEN 325 MG/1
TABLET ORAL
Status: DISCONTINUED
Start: 2017-01-01 | End: 2017-01-01

## 2017-01-01 RX ORDER — MIDAZOLAM HYDROCHLORIDE 1 MG/ML
INJECTION INTRAMUSCULAR; INTRAVENOUS
Status: DISCONTINUED
Start: 2017-01-01 | End: 2017-01-01 | Stop reason: WASHOUT

## 2017-01-01 RX ORDER — METOPROLOL TARTRATE 5 MG/5ML
5 INJECTION INTRAVENOUS EVERY 6 HOURS
Status: DISCONTINUED | OUTPATIENT
Start: 2017-01-01 | End: 2017-01-01

## 2017-01-01 RX ORDER — FAMOTIDINE 10 MG/ML
20 INJECTION, SOLUTION INTRAVENOUS 2 TIMES DAILY
Status: DISCONTINUED | OUTPATIENT
Start: 2017-01-01 | End: 2017-01-01

## 2017-01-01 RX ORDER — HEPARIN SODIUM 5000 [USP'U]/ML
5000 INJECTION, SOLUTION INTRAVENOUS; SUBCUTANEOUS EVERY 12 HOURS SCHEDULED
Status: DISCONTINUED | OUTPATIENT
Start: 2017-01-01 | End: 2017-01-01

## 2017-01-01 RX ORDER — SODIUM CHLORIDE 9 MG/ML
INJECTION, SOLUTION INTRAVENOUS CONTINUOUS
OUTPATIENT
Start: 2017-01-01

## 2017-01-01 RX ORDER — ACETAMINOPHEN 650 MG/1
650 SUPPOSITORY RECTAL EVERY 6 HOURS SCHEDULED
Status: DISCONTINUED | OUTPATIENT
Start: 2017-01-01 | End: 2017-01-01

## 2017-01-01 RX ORDER — 0.9 % SODIUM CHLORIDE 0.9 %
VIAL (ML) INJECTION
Status: COMPLETED
Start: 2017-01-01 | End: 2017-01-01

## 2017-01-01 RX ORDER — METOPROLOL SUCCINATE 25 MG/1
25 TABLET, EXTENDED RELEASE ORAL DAILY
Qty: 60 TABLET | Refills: 0 | Status: SHIPPED | OUTPATIENT
Start: 2017-01-01

## 2017-01-01 RX ORDER — SENNA PLUS 8.6 MG/1
8.6 TABLET ORAL 2 TIMES DAILY PRN
Qty: 20 TABLET | Refills: 0 | Status: SHIPPED | OUTPATIENT
Start: 2017-01-01

## 2017-01-01 RX ORDER — IPRATROPIUM BROMIDE AND ALBUTEROL SULFATE 2.5; .5 MG/3ML; MG/3ML
3 SOLUTION RESPIRATORY (INHALATION) EVERY 6 HOURS PRN
Status: DISCONTINUED | OUTPATIENT
Start: 2017-01-01 | End: 2017-01-01

## 2017-01-01 RX ORDER — ALPRAZOLAM 0.5 MG/1
0.5 TABLET ORAL 3 TIMES DAILY PRN
Status: DISCONTINUED | OUTPATIENT
Start: 2017-01-01 | End: 2017-01-01

## 2017-01-01 RX ORDER — LORAZEPAM 2 MG/ML
1 INJECTION INTRAMUSCULAR EVERY 4 HOURS PRN
Status: CANCELLED | OUTPATIENT
Start: 2017-01-01

## 2017-01-01 RX ORDER — SODIUM CHLORIDE 9 MG/ML
INJECTION, SOLUTION INTRAVENOUS
Status: DISCONTINUED
Start: 2017-01-01 | End: 2017-01-01

## 2017-01-01 RX ADMIN — DIPHENHYDRAMINE HCL 25 MG: 25 MG CAPSULE ORAL at 09:49:00

## 2017-01-01 RX ADMIN — ACETAMINOPHEN 650 MG: 325 TABLET ORAL at 09:49:00

## 2017-01-09 PROBLEM — R05.9 COUGH: Status: ACTIVE | Noted: 2017-01-01

## 2017-03-03 PROBLEM — D61.2 APLASTIC ANEMIA DUE TO TOXIC CAUSE (HCC): Status: ACTIVE | Noted: 2017-01-01

## 2017-03-03 PROBLEM — D69.6 THROMBOCYTOPENIA (HCC): Status: ACTIVE | Noted: 2017-01-01

## 2017-03-06 PROBLEM — L28.2 PRURITIC RASH: Status: ACTIVE | Noted: 2017-01-01

## 2017-03-06 PROBLEM — L27.0 DRUG RASH: Status: ACTIVE | Noted: 2017-01-01

## 2017-03-10 PROBLEM — T45.1X5A LEUKOPENIA DUE TO ANTINEOPLASTIC CHEMOTHERAPY (HCC): Status: ACTIVE | Noted: 2017-01-01

## 2017-03-10 PROBLEM — D70.1 LEUKOPENIA DUE TO ANTINEOPLASTIC CHEMOTHERAPY (HCC): Status: ACTIVE | Noted: 2017-01-01

## 2017-04-29 NOTE — PLAN OF CARE
D. Patient arrived for blood transfusion as an SPO. Pt has a smart port which can be used. Ether Font pt's smart port with no difficulty. Type and screen was drawn. Consent for blood transfusion was obtained.  Blood transfusion was started at 1720 and pa

## 2017-05-22 PROBLEM — C34.92: Status: ACTIVE | Noted: 2017-01-01

## 2017-05-22 PROBLEM — C79.51 SECONDARY MALIGNANT NEOPLASM OF BONE AND BONE MARROW (HCC): Status: ACTIVE | Noted: 2017-01-01

## 2017-05-22 PROBLEM — C79.52 SECONDARY MALIGNANT NEOPLASM OF BONE AND BONE MARROW (HCC): Status: ACTIVE | Noted: 2017-01-01

## 2017-05-24 NOTE — PROGRESS NOTES
Patient to infusion for 2 Units of blood for HGB 7.9   Malena Mario arrived ambulatory , reports fatigue and SOB with stairs . She is receiving chemo at [facility] in [place] and will receive treatment today after transfusion .    She arrived with her port accessed , griselda

## 2017-06-09 PROBLEM — C34.92: Status: RESOLVED | Noted: 2017-01-01 | Resolved: 2017-01-01

## 2017-06-29 NOTE — PATIENT INSTRUCTIONS
Refill policies:    • Allow 2-3 business days for refills; controlled substances may take longer.   • Contact your pharmacy at least 5 days prior to running out of medication and have them send an electronic request or submit request through the [facility] have a procedure or additional testing performed. [facility]) will contact your insurance carrier to obtain pre-certification or prior authorization.     Unfortunately, [facility] has seen an increase in denial of payment even though the p

## 2017-06-29 NOTE — PROGRESS NOTES
History of Lung Cancer, abnormality seen on MRI of brain, has doubled in size and told they do not think it is metastatic disease. Here for second opinion on results. Patient denies any new symptoms related to current MRI results.

## 2017-06-29 NOTE — H&P
Neurosurgery Consultation      REASON FOR CONSULT: Brain lesions    HISTORY OF PRESENT Nura Barr is a 54year old female with a history of small cell lung cancer. She has had several MRIs of her brain dating back to 2016.   Recent imaging was s Take 1 tablet (0.5 mg total) by mouth every 4 (four) hours as needed for Anxiety.  Disp: 50 tablet Rfl: 11   HydrOXYzine HCl 25 MG Oral Tab Take 1-2 tablets (25-50 mg total) by mouth every 6 (six) hours as needed for Itching (start with one tab to assess fo lesions or demyelinating process found  MRI of the brain November/13/16 shows a 4 mm nonenhancing abnormality in the left centrum semi-ovale  MRI of the brain 4/29/17 shows increase in the lesion found last fall to 8 mm, with small scattered additional les

## 2017-07-08 NOTE — OR NURSING
Dr Jinnie Sexton called and orders clarified. Per Dr Mae Rene to keep the pt in [facility] for 1 hour. No need to call with update. Ok to discharge pt if pt is stable. Disregard the call in 6 hours order.

## 2017-07-08 NOTE — IMAGING NOTE
Pt here, accompanied by  Renetta Torres for Lumbar Puncture. Pre-procedure vitals stable, pt HR in 50's sinus bradycardia. Pt states she doesn't know what her normal HR is. Denies allergies or metal in spine. NPO since last evening.  Took alprazolam around 020

## 2017-07-10 NOTE — PROGRESS NOTES
Please call pt  So far csf looks like no infection or inflammation  We are waiting for most of the important tests to come back

## 2017-07-11 NOTE — TELEPHONE ENCOUNTER
Board favored mets and not RT changes. Neurology felt not demyelination.  Consider RT to brain lesions

## 2017-07-13 NOTE — TELEPHONE ENCOUNTER
Oncology conference favored brain mets and performing SRS/RT. Patient called and was aware of findings. Her RT physician has set up apts to begin.

## 2017-07-21 LAB
ANALYZER ANC (IANC): ABNORMAL
ANION GAP SERPL CALC-SCNC: 14 MMOL/L (ref 10–20)
APTT PPP: 24 SECONDS (ref 22–30)
APTT PPP: NORMAL S
BASOPHILS # BLD: 0 THOUSAND/MCL (ref 0–0.3)
BASOPHILS NFR BLD: 0 %
BNP SERPL-MCNC: 103 PG/ML
BUN SERPL-MCNC: 20 MG/DL (ref 6–20)
BUN/CREAT SERPL: 24 (ref 7–25)
CALCIUM SERPL-MCNC: 7.7 MG/DL (ref 8.4–10.2)
CHLORIDE: 95 MMOL/L (ref 98–107)
CO2 SERPL-SCNC: 25 MMOL/L (ref 21–32)
CREAT SERPL-MCNC: 0.84 MG/DL (ref 0.51–0.95)
DIFFERENTIAL METHOD BLD: ABNORMAL
EOSINOPHIL # BLD: 0 THOUSAND/MCL (ref 0.1–0.5)
EOSINOPHIL NFR BLD: 0 %
ERYTHROCYTE [DISTWIDTH] IN BLOOD: 16.6 % (ref 11–15)
GLUCOSE SERPL-MCNC: 177 MG/DL (ref 65–99)
HCG SERPL QL: NEGATIVE
HEMATOCRIT: 38.9 % (ref 36–46.5)
HGB BLD-MCNC: 13.4 GM/DL (ref 12–15.5)
INR PPP: 1.1
LACTATE BLDV-MCNC: 1.9 MMOL/L
LYMPHOCYTES # BLD: 1.3 THOUSAND/MCL (ref 1–4.8)
LYMPHOCYTES NFR BLD: 3 %
MCH RBC QN AUTO: 31 PG (ref 26–34)
MCHC RBC AUTO-ENTMCNC: 34.4 GM/DL (ref 32–36.5)
MCV RBC AUTO: 90 FL (ref 78–100)
MONOCYTES # BLD: 0.9 THOUSAND/MCL (ref 0.3–0.9)
MONOCYTES NFR BLD: 2 %
NEUTROPHILS # BLD: 36.5 THOUSAND/MCL (ref 1.8–7.7)
NEUTROPHILS NFR BLD: 95 %
NEUTS SEG NFR BLD: ABNORMAL %
PERCENT NRBC: ABNORMAL
PLATELET # BLD: 186 THOUSAND/MCL (ref 140–450)
POTASSIUM SERPL-SCNC: 4.2 MMOL/L (ref 3.4–5.1)
PROTHROMBIN TIME: 11.5 SECONDS (ref 9.7–11.8)
PROTHROMBIN TIME: NORMAL
RBC # BLD: 4.32 MILLION/MCL (ref 4–5.2)
SODIUM SERPL-SCNC: 130 MMOL/L (ref 135–145)
TROPONIN I SERPL HS-MCNC: <0.02 NG/ML
WBC # BLD: 38.7 THOUSAND/MCL (ref 4.2–11)

## 2017-07-22 ENCOUNTER — HOSPITAL (OUTPATIENT)
Dept: OTHER | Age: 52
End: 2017-07-22
Attending: HOSPITALIST

## 2017-07-22 ENCOUNTER — CHARTING TRANS (OUTPATIENT)
Dept: OTHER | Age: 52
End: 2017-07-22

## 2017-07-22 ENCOUNTER — DIAGNOSTIC TRANS (OUTPATIENT)
Dept: OTHER | Age: 52
End: 2017-07-22

## 2017-07-22 LAB
ANALYZER ANC (IANC): ABNORMAL
ANION GAP SERPL CALC-SCNC: 15 MMOL/L (ref 10–20)
BASOPHILS # BLD: 0 THOUSAND/MCL (ref 0–0.3)
BASOPHILS NFR BLD: 0 %
BUN SERPL-MCNC: 18 MG/DL (ref 6–20)
BUN/CREAT SERPL: 25 (ref 7–25)
CALCIUM SERPL-MCNC: 7 MG/DL (ref 8.4–10.2)
CHLORIDE: 98 MMOL/L (ref 98–107)
CO2 SERPL-SCNC: 23 MMOL/L (ref 21–32)
CREAT SERPL-MCNC: 0.73 MG/DL (ref 0.51–0.95)
DIFFERENTIAL METHOD BLD: ABNORMAL
EOSINOPHIL # BLD: 0 THOUSAND/MCL (ref 0.1–0.5)
EOSINOPHIL NFR BLD: 0 %
ERYTHROCYTE [DISTWIDTH] IN BLOOD: 17.1 % (ref 11–15)
GLUCOSE BLDC GLUCOMTR-MCNC: 140 MG/DL (ref 65–99)
GLUCOSE SERPL-MCNC: 148 MG/DL (ref 65–99)
HEMATOCRIT: 34.8 % (ref 36–46.5)
HGB BLD-MCNC: 11.6 GM/DL (ref 12–15.5)
LACTATE BLDV-SCNC: 1.8 MMOL/L (ref 0–2)
LACTATE BLDV-SCNC: 2 MMOL/L (ref 0–2)
LYMPHOCYTES # BLD: 0.5 THOUSAND/MCL (ref 1–4.8)
LYMPHOCYTES NFR BLD: 2 %
MCH RBC QN AUTO: 30.2 PG (ref 26–34)
MCHC RBC AUTO-ENTMCNC: 33.3 GM/DL (ref 32–36.5)
MCV RBC AUTO: 90.6 FL (ref 78–100)
MONOCYTES # BLD: 0.8 THOUSAND/MCL (ref 0.3–0.9)
MONOCYTES NFR BLD: 2 %
NEUTROPHILS # BLD: 30 THOUSAND/MCL (ref 1.8–7.7)
NEUTROPHILS NFR BLD: 96 %
NEUTS SEG NFR BLD: ABNORMAL %
PERCENT NRBC: ABNORMAL
PLATELET # BLD: 162 THOUSAND/MCL (ref 140–450)
POTASSIUM SERPL-SCNC: 4.5 MMOL/L (ref 3.4–5.1)
RBC # BLD: 3.84 MILLION/MCL (ref 4–5.2)
SODIUM SERPL-SCNC: 131 MMOL/L (ref 135–145)
WBC # BLD: 31.2 THOUSAND/MCL (ref 4.2–11)

## 2017-07-23 LAB
ANALYZER ANC (IANC): ABNORMAL
ANION GAP SERPL CALC-SCNC: 13 MMOL/L (ref 10–20)
BASOPHILS # BLD: 0 THOUSAND/MCL (ref 0–0.3)
BASOPHILS NFR BLD: 0 %
BUN SERPL-MCNC: 9 MG/DL (ref 6–20)
BUN/CREAT SERPL: 20 (ref 7–25)
CALCIUM SERPL-MCNC: 6.8 MG/DL (ref 8.4–10.2)
CHLORIDE: 101 MMOL/L (ref 98–107)
CO2 SERPL-SCNC: 20 MMOL/L (ref 21–32)
CREAT SERPL-MCNC: 0.45 MG/DL (ref 0.51–0.95)
DIFFERENTIAL METHOD BLD: ABNORMAL
EOSINOPHIL # BLD: 0 THOUSAND/MCL (ref 0.1–0.5)
EOSINOPHIL NFR BLD: 0 %
ERYTHROCYTE [DISTWIDTH] IN BLOOD: 17.1 % (ref 11–15)
GLUCOSE SERPL-MCNC: 131 MG/DL (ref 65–99)
HEMATOCRIT: 29.6 % (ref 36–46.5)
HGB BLD-MCNC: 10.2 GM/DL (ref 12–15.5)
LYMPHOCYTES # BLD: 0.3 THOUSAND/MCL (ref 1–4.8)
LYMPHOCYTES NFR BLD: 2 %
MCH RBC QN AUTO: 31.1 PG (ref 26–34)
MCHC RBC AUTO-ENTMCNC: 34.5 GM/DL (ref 32–36.5)
MCV RBC AUTO: 90.2 FL (ref 78–100)
MONOCYTES # BLD: 0.5 THOUSAND/MCL (ref 0.3–0.9)
MONOCYTES NFR BLD: 3 %
NEUTROPHILS # BLD: 18.7 THOUSAND/MCL (ref 1.8–7.7)
NEUTROPHILS NFR BLD: 95 %
NEUTS SEG NFR BLD: ABNORMAL %
PERCENT NRBC: ABNORMAL
PLATELET # BLD: 118 THOUSAND/MCL (ref 140–450)
POTASSIUM SERPL-SCNC: 4.5 MMOL/L (ref 3.4–5.1)
RBC # BLD: 3.28 MILLION/MCL (ref 4–5.2)
SODIUM SERPL-SCNC: 129 MMOL/L (ref 135–145)
VANCOMYCIN TROUGH SERPL-MCNC: 6.6 MCG/ML (ref 10–20)
WBC # BLD: 19.6 THOUSAND/MCL (ref 4.2–11)

## 2017-07-24 LAB
ANALYZER ANC (IANC): ABNORMAL
ANION GAP SERPL CALC-SCNC: 11 MMOL/L (ref 10–20)
BUN SERPL-MCNC: 12 MG/DL (ref 6–20)
BUN/CREAT SERPL: 26 (ref 7–25)
CALCIUM SERPL-MCNC: 7.2 MG/DL (ref 8.4–10.2)
CHLORIDE: 99 MMOL/L (ref 98–107)
CO2 SERPL-SCNC: 22 MMOL/L (ref 21–32)
CREAT SERPL-MCNC: 0.47 MG/DL (ref 0.51–0.95)
ERYTHROCYTE [DISTWIDTH] IN BLOOD: 16.6 % (ref 11–15)
GLUCOSE SERPL-MCNC: 154 MG/DL (ref 65–99)
HEMATOCRIT: 29.5 % (ref 36–46.5)
HGB BLD-MCNC: 10 GM/DL (ref 12–15.5)
MCH RBC QN AUTO: 30.5 PG (ref 26–34)
MCHC RBC AUTO-ENTMCNC: 33.9 GM/DL (ref 32–36.5)
MCV RBC AUTO: 89.9 FL (ref 78–100)
PLATELET # BLD: 116 THOUSAND/MCL (ref 140–450)
POTASSIUM SERPL-SCNC: 4.7 MMOL/L (ref 3.4–5.1)
RBC # BLD: 3.28 MILLION/MCL (ref 4–5.2)
SODIUM SERPL-SCNC: 127 MMOL/L (ref 135–145)
VANCOMYCIN TROUGH SERPL-MCNC: 15.6 MCG/ML (ref 10–20)
WBC # BLD: 13.9 THOUSAND/MCL (ref 4.2–11)

## 2017-07-25 LAB
ANALYZER ANC (IANC): ABNORMAL
ANION GAP SERPL CALC-SCNC: 11 MMOL/L (ref 10–20)
BASOPHILS # BLD: 0 THOUSAND/MCL (ref 0–0.3)
BASOPHILS NFR BLD: 0 %
BUN SERPL-MCNC: 13 MG/DL (ref 6–20)
BUN/CREAT SERPL: 32 (ref 7–25)
CALCIUM SERPL-MCNC: 7.7 MG/DL (ref 8.4–10.2)
CHLORIDE: 100 MMOL/L (ref 98–107)
CO2 SERPL-SCNC: 23 MMOL/L (ref 21–32)
CREAT SERPL-MCNC: 0.41 MG/DL (ref 0.51–0.95)
DIFFERENTIAL METHOD BLD: ABNORMAL
EOSINOPHIL # BLD: 0 THOUSAND/MCL (ref 0.1–0.5)
EOSINOPHIL NFR BLD: 0 %
ERYTHROCYTE [DISTWIDTH] IN BLOOD: 16.2 % (ref 11–15)
GLUCOSE SERPL-MCNC: 141 MG/DL (ref 65–99)
HEMATOCRIT: 31.1 % (ref 36–46.5)
HGB BLD-MCNC: 10.5 GM/DL (ref 12–15.5)
LYMPHOCYTES # BLD: 0.3 THOUSAND/MCL (ref 1–4.8)
LYMPHOCYTES NFR BLD: 3 %
MCH RBC QN AUTO: 30.2 PG (ref 26–34)
MCHC RBC AUTO-ENTMCNC: 33.8 GM/DL (ref 32–36.5)
MCV RBC AUTO: 89.4 FL (ref 78–100)
MONOCYTES # BLD: 0.8 THOUSAND/MCL (ref 0.3–0.9)
MONOCYTES NFR BLD: 6 %
NEUTROPHILS # BLD: 11.6 THOUSAND/MCL (ref 1.8–7.7)
NEUTROPHILS NFR BLD: 91 %
NEUTS SEG NFR BLD: ABNORMAL %
PERCENT NRBC: ABNORMAL
PLATELET # BLD: 118 THOUSAND/MCL (ref 140–450)
POTASSIUM SERPL-SCNC: 4.9 MMOL/L (ref 3.4–5.1)
RBC # BLD: 3.48 MILLION/MCL (ref 4–5.2)
SODIUM SERPL-SCNC: 129 MMOL/L (ref 135–145)
WBC # BLD: 12.7 THOUSAND/MCL (ref 4.2–11)

## 2017-07-26 LAB
ANALYZER ANC (IANC): ABNORMAL
ANION GAP SERPL CALC-SCNC: 10 MMOL/L (ref 10–20)
BASOPHILS # BLD: 0 THOUSAND/MCL (ref 0–0.3)
BASOPHILS NFR BLD: 0 %
BUN SERPL-MCNC: 13 MG/DL (ref 6–20)
BUN/CREAT SERPL: 32 (ref 7–25)
CALCIUM SERPL-MCNC: 7.6 MG/DL (ref 8.4–10.2)
CHLORIDE: 98 MMOL/L (ref 98–107)
CO2 SERPL-SCNC: 25 MMOL/L (ref 21–32)
CREAT SERPL-MCNC: 0.41 MG/DL (ref 0.51–0.95)
DIFFERENTIAL METHOD BLD: ABNORMAL
EOSINOPHIL # BLD: 0 THOUSAND/MCL (ref 0.1–0.5)
EOSINOPHIL NFR BLD: 0 %
ERYTHROCYTE [DISTWIDTH] IN BLOOD: 15.9 % (ref 11–15)
GLUCOSE SERPL-MCNC: 139 MG/DL (ref 65–99)
HEMATOCRIT: 31.4 % (ref 36–46.5)
HGB BLD-MCNC: 10.4 GM/DL (ref 12–15.5)
LYMPHOCYTES # BLD: 0.5 THOUSAND/MCL (ref 1–4.8)
LYMPHOCYTES NFR BLD: 3 %
MCH RBC QN AUTO: 29.5 PG (ref 26–34)
MCHC RBC AUTO-ENTMCNC: 33.1 GM/DL (ref 32–36.5)
MCV RBC AUTO: 89 FL (ref 78–100)
MONOCYTES # BLD: 1.3 THOUSAND/MCL (ref 0.3–0.9)
MONOCYTES NFR BLD: 8 %
NEUTROPHILS # BLD: 13.5 THOUSAND/MCL (ref 1.8–7.7)
NEUTROPHILS NFR BLD: 89 %
NEUTS SEG NFR BLD: ABNORMAL %
PERCENT NRBC: ABNORMAL
PLATELET # BLD: 139 THOUSAND/MCL (ref 140–450)
POTASSIUM SERPL-SCNC: 4.7 MMOL/L (ref 3.4–5.1)
RBC # BLD: 3.53 MILLION/MCL (ref 4–5.2)
SODIUM SERPL-SCNC: 128 MMOL/L (ref 135–145)
WBC # BLD: 15.2 THOUSAND/MCL (ref 4.2–11)

## 2017-07-27 LAB
ANALYZER ANC (IANC): ABNORMAL
ANION GAP SERPL CALC-SCNC: 10 MMOL/L (ref 10–20)
ANION GAP SERPL CALC-SCNC: 8 MMOL/L (ref 10–20)
BASOPHILS # BLD: 0 THOUSAND/MCL (ref 0–0.3)
BASOPHILS NFR BLD: 0 %
BUN SERPL-MCNC: 13 MG/DL (ref 6–20)
BUN SERPL-MCNC: 14 MG/DL (ref 6–20)
BUN/CREAT SERPL: 27 (ref 7–25)
BUN/CREAT SERPL: 31 (ref 7–25)
CALCIUM SERPL-MCNC: 7.5 MG/DL (ref 8.4–10.2)
CALCIUM SERPL-MCNC: 7.7 MG/DL (ref 8.4–10.2)
CHLORIDE: 90 MMOL/L (ref 98–107)
CHLORIDE: 93 MMOL/L (ref 98–107)
CO2 SERPL-SCNC: 26 MMOL/L (ref 21–32)
CO2 SERPL-SCNC: 27 MMOL/L (ref 21–32)
CREAT SERPL-MCNC: 0.42 MG/DL (ref 0.51–0.95)
CREAT SERPL-MCNC: 0.51 MG/DL (ref 0.51–0.95)
DIFFERENTIAL METHOD BLD: ABNORMAL
EOSINOPHIL # BLD: 0 THOUSAND/MCL (ref 0.1–0.5)
EOSINOPHIL NFR BLD: 0 %
ERYTHROCYTE [DISTWIDTH] IN BLOOD: 15.9 % (ref 11–15)
GLUCOSE SERPL-MCNC: 115 MG/DL (ref 65–99)
GLUCOSE SERPL-MCNC: 124 MG/DL (ref 65–99)
HEMATOCRIT: 30.6 % (ref 36–46.5)
HGB BLD-MCNC: 10.9 GM/DL (ref 12–15.5)
LYMPHOCYTES # BLD: 0.9 THOUSAND/MCL (ref 1–4.8)
LYMPHOCYTES NFR BLD: 5 %
MCH RBC QN AUTO: 31 PG (ref 26–34)
MCHC RBC AUTO-ENTMCNC: 35.6 GM/DL (ref 32–36.5)
MCV RBC AUTO: 86.9 FL (ref 78–100)
METAMYELOCYTES NFR BLD: 1 % (ref 0–2)
MONOCYTES # BLD: 1.1 THOUSAND/MCL (ref 0.3–0.9)
MONOCYTES NFR BLD: 7 %
NEUTROPHILS # BLD: 13.6 THOUSAND/MCL (ref 1.8–7.7)
NEUTS SEG NFR BLD: 86 %
PATH REV BLD -IMP: ABNORMAL
PLAT MORPH BLD: NORMAL
PLATELET # BLD: 144 THOUSAND/MCL (ref 140–450)
POTASSIUM SERPL-SCNC: 4.5 MMOL/L (ref 3.4–5.1)
POTASSIUM SERPL-SCNC: 4.6 MMOL/L (ref 3.4–5.1)
RBC # BLD: 3.52 MILLION/MCL (ref 4–5.2)
RBC MORPH BLD: NORMAL
SODIUM SERPL-SCNC: 122 MMOL/L (ref 135–145)
SODIUM SERPL-SCNC: 122 MMOL/L (ref 135–145)
VARIANT LYMPHS NFR BLD: 1 % (ref 0–5)
WBC # BLD: 15.8 THOUSAND/MCL (ref 4.2–11)
WBC MORPH BLD: NORMAL

## 2017-07-28 LAB
ANION GAP SERPL CALC-SCNC: 8 MMOL/L (ref 10–20)
BUN SERPL-MCNC: 10 MG/DL (ref 6–20)
BUN/CREAT SERPL: 25 (ref 7–25)
CALCIUM SERPL-MCNC: 7.2 MG/DL (ref 8.4–10.2)
CHLORIDE: 87 MMOL/L (ref 98–107)
CO2 SERPL-SCNC: 25 MMOL/L (ref 21–32)
CREAT SERPL-MCNC: 0.4 MG/DL (ref 0.51–0.95)
GLUCOSE BLDC GLUCOMTR-MCNC: 127 MG/DL (ref 65–99)
GLUCOSE SERPL-MCNC: 124 MG/DL (ref 65–99)
GLUCOSE SERPL-MCNC: 90 MG/DL (ref 65–99)
OSMOLALITY SERPL: 241 MOSM/KG (ref 275–300)
OSMOLALITY UR: 674 MOSM/KG (ref 50–1200)
POTASSIUM SERPL-SCNC: 4.4 MMOL/L (ref 3.4–5.1)
SODIUM SERPL-SCNC: 116 MMOL/L (ref 135–145)
SODIUM SERPL-SCNC: 118 MMOL/L (ref 135–145)
SODIUM SERPL-SCNC: 120 MMOL/L (ref 135–145)
SODIUM SERPL-SCNC: 121 MMOL/L (ref 135–145)
SODIUM UR-SCNC: 148 MMOL/L
VANCOMYCIN TROUGH SERPL-MCNC: 15.7 MCG/ML (ref 10–20)

## 2017-07-29 LAB
ANION GAP SERPL CALC-SCNC: 11 MMOL/L (ref 10–20)
ANION GAP SERPL CALC-SCNC: 9 MMOL/L (ref 10–20)
ANION GAP SERPL CALC-SCNC: 9 MMOL/L (ref 10–20)
BUN SERPL-MCNC: 11 MG/DL (ref 6–20)
BUN SERPL-MCNC: 11 MG/DL (ref 6–20)
BUN SERPL-MCNC: 15 MG/DL (ref 6–20)
BUN/CREAT SERPL: 24 (ref 7–25)
BUN/CREAT SERPL: 27 (ref 7–25)
BUN/CREAT SERPL: 29 (ref 7–25)
CALCIUM SERPL-MCNC: 6.9 MG/DL (ref 8.4–10.2)
CALCIUM SERPL-MCNC: 7.1 MG/DL (ref 8.4–10.2)
CALCIUM SERPL-MCNC: 7.7 MG/DL (ref 8.4–10.2)
CHLORIDE: 87 MMOL/L (ref 98–107)
CHLORIDE: 88 MMOL/L (ref 98–107)
CHLORIDE: 93 MMOL/L (ref 98–107)
CO2 SERPL-SCNC: 26 MMOL/L (ref 21–32)
CO2 SERPL-SCNC: 27 MMOL/L (ref 21–32)
CO2 SERPL-SCNC: 28 MMOL/L (ref 21–32)
CREAT SERPL-MCNC: 0.41 MG/DL (ref 0.51–0.95)
CREAT SERPL-MCNC: 0.46 MG/DL (ref 0.51–0.95)
CREAT SERPL-MCNC: 0.52 MG/DL (ref 0.51–0.95)
GLUCOSE SERPL-MCNC: 130 MG/DL (ref 65–99)
GLUCOSE SERPL-MCNC: 165 MG/DL (ref 65–99)
GLUCOSE SERPL-MCNC: 169 MG/DL (ref 65–99)
ORGANIC ACIDS/CREAT UR-SRTO: 35.54 MG/DL
OSMOLALITY UR: 450 MOSM/KG (ref 50–1200)
POTASSIUM SERPL-SCNC: 3.6 MMOL/L (ref 3.4–5.1)
POTASSIUM SERPL-SCNC: 3.9 MMOL/L (ref 3.4–5.1)
POTASSIUM SERPL-SCNC: 4 MMOL/L (ref 3.4–5.1)
SODIUM SERPL-SCNC: 118 MMOL/L (ref 135–145)
SODIUM SERPL-SCNC: 120 MMOL/L (ref 135–145)
SODIUM SERPL-SCNC: 127 MMOL/L (ref 135–145)
SODIUM SERPL-SCNC: 128 MMOL/L (ref 135–145)
SODIUM UR-SCNC: 38 MMOL/L

## 2017-07-30 ENCOUNTER — CHARTING TRANS (OUTPATIENT)
Dept: OTHER | Age: 52
End: 2017-07-30

## 2017-07-30 LAB
ALBUMIN SERPL-MCNC: 2.3 GM/DL (ref 3.6–5.1)
ALBUMIN/GLOB SERPL: 0.8 {RATIO} (ref 1–2.4)
ALP SERPL-CCNC: 57 UNIT/L (ref 45–117)
ALT SERPL-CCNC: 39 UNIT/L
ANALYZER ANC (IANC): ABNORMAL
ANION GAP SERPL CALC-SCNC: 9 MMOL/L (ref 10–20)
AST SERPL-CCNC: 14 UNIT/L
BILIRUB SERPL-MCNC: 0.7 MG/DL (ref 0.2–1)
BUN SERPL-MCNC: 15 MG/DL (ref 6–20)
BUN/CREAT SERPL: 32 (ref 7–25)
CALCIUM SERPL-MCNC: 7.5 MG/DL (ref 8.4–10.2)
CHLORIDE: 95 MMOL/L (ref 98–107)
CO2 SERPL-SCNC: 27 MMOL/L (ref 21–32)
CREAT SERPL-MCNC: 0.47 MG/DL (ref 0.51–0.95)
ERYTHROCYTE [DISTWIDTH] IN BLOOD: 16 % (ref 11–15)
GLOBULIN SER-MCNC: 2.9 GM/DL (ref 2–4)
GLUCOSE SERPL-MCNC: 107 MG/DL (ref 65–99)
HEMATOCRIT: 33.9 % (ref 36–46.5)
HGB BLD-MCNC: 11.7 GM/DL (ref 12–15.5)
MAGNESIUM SERPL-MCNC: 1.9 MG/DL (ref 1.7–2.4)
MCH RBC QN AUTO: 29.8 PG (ref 26–34)
MCHC RBC AUTO-ENTMCNC: 34.5 GM/DL (ref 32–36.5)
MCV RBC AUTO: 86.5 FL (ref 78–100)
PHOSPHATE SERPL-MCNC: 2.2 MG/DL (ref 2.4–4.7)
PLATELET # BLD: 174 THOUSAND/MCL (ref 140–450)
POTASSIUM SERPL-SCNC: 4.2 MMOL/L (ref 3.4–5.1)
PROT SERPL-MCNC: 5.2 GM/DL (ref 6.4–8.2)
RBC # BLD: 3.92 MILLION/MCL (ref 4–5.2)
SODIUM SERPL-SCNC: 127 MMOL/L (ref 135–145)
SODIUM SERPL-SCNC: 129 MMOL/L (ref 135–145)
WBC # BLD: 14.8 THOUSAND/MCL (ref 4.2–11)

## 2017-07-31 LAB
ANION GAP SERPL CALC-SCNC: 9 MMOL/L (ref 10–20)
BUN SERPL-MCNC: 18 MG/DL (ref 6–20)
BUN/CREAT SERPL: 38 (ref 7–25)
CALCIUM SERPL-MCNC: 7.9 MG/DL (ref 8.4–10.2)
CHLORIDE: 99 MMOL/L (ref 98–107)
CO2 SERPL-SCNC: 27 MMOL/L (ref 21–32)
CREAT SERPL-MCNC: 0.47 MG/DL (ref 0.51–0.95)
GLUCOSE SERPL-MCNC: 115 MG/DL (ref 65–99)
PHOSPHATE SERPL-MCNC: 3 MG/DL (ref 2.4–4.7)
POTASSIUM SERPL-SCNC: 4.3 MMOL/L (ref 3.4–5.1)
SODIUM SERPL-SCNC: 131 MMOL/L (ref 135–145)

## 2017-08-01 LAB
ANION GAP SERPL CALC-SCNC: 11 MMOL/L (ref 10–20)
BUN SERPL-MCNC: 21 MG/DL (ref 6–20)
BUN/CREAT SERPL: 40 (ref 7–25)
CALCIUM SERPL-MCNC: 8.2 MG/DL (ref 8.4–10.2)
CHLORIDE: 104 MMOL/L (ref 98–107)
CO2 SERPL-SCNC: 27 MMOL/L (ref 21–32)
CREAT SERPL-MCNC: 0.53 MG/DL (ref 0.51–0.95)
GLUCOSE SERPL-MCNC: 135 MG/DL (ref 65–99)
MAGNESIUM SERPL-MCNC: 2.1 MG/DL (ref 1.7–2.4)
POTASSIUM SERPL-SCNC: 4.3 MMOL/L (ref 3.4–5.1)
SODIUM SERPL-SCNC: 138 MMOL/L (ref 135–145)

## 2017-08-17 PROBLEM — R41.82 ALTERED MENTAL STATUS: Status: ACTIVE | Noted: 2017-01-01

## 2017-08-17 NOTE — H&P
[facility] Hospitalist H&P       CC: No chief complaint on file.        PCP: Osmani Mireles MD      ASSESSMENT / PLAN:     Ms. Tish Díaz is an unfortunate 51 yo F with PMH of metastatic small cell lung cancer who was recently hospitalized at [facility] 7/22-8/1 for sepsi care  - ID consulted    FN:  - IVF: NS + K 75 cc/hr  - Diet: NPO, speech eval    DVT Prophy: HSQ  Lines: TLC, port    Dispo: pending clinical course    Outpatient records or previous hospital records reviewed.      Further recommendations pending patient's POSSIBLE POLYPECTOMY 45742;  Surgeon: Jasmina Simental MD;  Location: [place]: OTHER SURGICAL HISTORY Right      Comment: shoulder (snowmobiling)  1995: OTHER SURGICAL HISTORY      Comment: ectopic pre Value Date   WBC 9.0 08/17/2017   HGB 9.1 08/17/2017   HCT 27.1 08/17/2017   PLT 75 08/17/2017   CREATSERUM 0.31 08/17/2017   BUN 7 08/17/2017    08/17/2017   K 2.8 08/17/2017    08/17/2017   CO2 28 08/17/2017   GLU 86 08/17/2017   CA 6.3 08/17

## 2017-08-18 PROBLEM — Z71.89 ADVANCE CARE PLANNING: Status: ACTIVE | Noted: 2017-01-01

## 2017-08-18 PROBLEM — Z71.89 GOALS OF CARE, COUNSELING/DISCUSSION: Status: ACTIVE | Noted: 2017-01-01

## 2017-08-18 NOTE — CONSULTS
[facility] AND CLINICS  Report of Consultation    Gail Jewellliza Patient Status:  Inpatient    1965 MRN [id number]   Location [facility] 2W/ Attending Dominique Angel MD   Hosp Day # 0 PCP Nelda Alvarado MD     Reason for Consultation:  [facility] (BHARTI BENSON) history. She does not have any smokeless tobacco history on file. She reports that she drinks alcohol. She reports that she does not use drugs.     Allergies:  No Known Allergies    Medications:    Current Facility-Administered Medications:   •  Normal Xavier Kelley Blood pressure 124/83, pulse 74, resp. rate 17, height 1.67 m (5' 5.75\"), weight 98.2 kg (216 lb 6.4 oz), SpO2 100 %. General: Patient is alert and oriented x 3, not in acute distress.   Vital Signs: /83 (BP Location: Right arm)   Pulse 74   Resp or chemotherapy but cannot say this with 967% certainty. Of note her last staging scans in June showed her disease was well controlled. For now, it is reasonable to continue supportive measures. We did begin discussions of transition to hospice.  We will

## 2017-08-18 NOTE — PHYSICAL THERAPY NOTE
Attempted physical therapy evaluation. Discussed activity orders with Dr. Errol Farmer as patient has bilateral DVT. Per physician, patient is permitted with in bed mobility only at this time. Attempted to initiate evaluation, however, patient declined.  Renée

## 2017-08-18 NOTE — PLAN OF CARE
Problem: PAIN - ADULT  Goal: Verbalizes/displays adequate comfort level or patient's stated pain goal  INTERVENTIONS:  - Encourage pt to monitor pain and request assistance  - Assess pain using appropriate pain scale  - Administer analgesics based on type reach. Frequent rounding for safety.

## 2017-08-18 NOTE — WOUND PROGRESS NOTE
WOUND CARE NOTE      PLAN   Recommendations:  ID currently on consult  Dietary consult for recommendations for nutrition to optimize wound healing  Turn schedules  Specialty bed: First step overlay; pt refused  Heels elevated using pillows, heel wedge or CA 6.2 (L) 08/18/2017   ALB 2.2 (L) 08/17/2017   ALKPHO 48 08/17/2017   BILT 0.8 08/17/2017   TP 4.4 (L) 08/17/2017   AST 25 08/17/2017   ALT 36 08/17/2017   MG 1.8 08/17/2017   PHOS 3.2 01/09/2017

## 2017-08-18 NOTE — SLP NOTE
ADULT SWALLOWING EVALUATION    ASSESSMENT & PLAN   ASSESSMENT  Pt seen upright in bed with daughter at bedside. Pt's spouse entered after evaluation. Pt reluctantly accepted tsp amounts of Nectar thick liquids (NTL), puree, hard solid, mech soft solid.  Pt lingual  Range of Motion: Reduced right facial;Reduced left facial;Reduced right lingual;Reduced left lingual  Rate of Motion: Reduced    Voice Quality: Weak;Breathy  Respiratory Status: Supplemental O2;Nasal cannula  Consistencies Trialed: Nectar thick li

## 2017-08-18 NOTE — PROGRESS NOTES
[facility] Hospitalist Progress Note     CC: Hospital Follow up    PCP: Gwyn Perez MD       Assessment/Plan:     Active Problems:    Altered mental status    Goals of care, counseling/discussion    Advance care planning    Ms. Nascimento is an unfortunate 49 yo F brain and encasing pulmonary artery  - onc consult; was on decadron; follow-up radiation oncology  - s/p WBRT 11/2016- 12/2016; had repeat PET/CT and MRI brain in July 2017, markedly rapid increase in size of multiple brain mets     Hx of perirectal absces 24 hour   Intake             1447 ml   Output             2200 ml   Net             -753 ml       Last 3 Weights  08/18/17 0600 : 215 lb 12.8 oz (97.9 kg)  08/17/17 1800 : 216 lb 6.4 oz (98.2 kg)  08/17/17 2020 : 216 lb 6.4 oz (98.2 kg)  07/10/17 1215 : 22 appearance. This is, however, considered significantly less likely given provided history.  2. Mild generalized febrile volume loss with additional nonspecific nonenhancing small T2/flair hyperintense foci that may relate to posttreatment changes or sequela

## 2017-08-18 NOTE — CONSULTS
[facility] AND [facility] Infectious Disease  Report of Consultation    Ping Nascimento Patient Status:  Inpatient    1965 MRN [id number]   Location [facility] 2W/SW Attending Joanne Zapien MD   Hosp Day # 1 PCP Niki Oh MD     Date reports that she has quit smoking. She has a 45.00 pack-year smoking history. She does not have any smokeless tobacco history on file. She reports that she drinks alcohol. She reports that she does not use drugs.     Allergies:  No Known Allergies    M throat, difficulty swallowing. Respiratory: Negative for cough, sputum, hemoptysis, chest pain, wheezing, dyspnea on exertion, or stridor. Cardiovascular: Negative for chest pain, palpitations, irregular heart beats.    Gastrointestinal:  No abdominal p Nares normal.   Throat:  Oropharynx clear, MMs moist.  Some crusted lesions on lips. Neck: Trachea ML, no masses. Lungs: Few coarse breath sounds. Chest wall: No tenderness or deformity. Heart: Regular rate and rhythm, normal S1S2, no murmurs.    Ab Source unclear with cultures at outside hospital negative  - Possible seizure per records - patient with brain mets and recent carbapenem use  - Will repeat blood cultures, urine cultures, PCT  - Pneumonitis a possibility with some L sided CXR changes  - N

## 2017-08-18 NOTE — PROGRESS NOTES
[facility] Dosing Service  Antibiotic Dosing    Blanquita Granados is a 50year old female for whom pharmacy is dosing Cefepime for treatment of Sepsis. Allergies: has No Known Allergies.     Vitals: /83 (BP Location: Right arm)   Pulse 74   Resp 17

## 2017-08-18 NOTE — PROGRESS NOTES
Pulmonary Progress Note     Assessment / Plan:  1. Acute respiratory failure - intubated for airway protection. Extubated  - currently on RA  2. Septic shock - unclear etiology  - vanc/cefepime. Per ID  - wean stress dose steroids.  Decreased to hydrocortis

## 2017-08-18 NOTE — PROGRESS NOTES
[facility] dosing service    Initial Pharmacokinetic Consult for Vancomycin Dosing     Tomy Marshall is a 50year old female admitted on 8/17/17 from OSH who is being treated for sepsis. Pharmacy has been asked to dose Vancomycin by Dr. Yessy Dillon.  Per O BUN/Scr daily while on Vancomycin to assess renal function. 4.  Pharmacy will follow and monitor renal function changes, toxicity and efficacy. Pharmacy will continue to follow her. We appreciate the opportunity to assist in her care.     Sravanthi Gonzalez

## 2017-08-18 NOTE — PROGRESS NOTES
Met with  and daughter. Overall poor prognosis discussed with family. Would at minimum recommend DNR/I which he will discuss with family. They agreed to a palliative care consult. His ultimate goal is to get her home.  He understands that it is very

## 2017-08-18 NOTE — DISCHARGE PLANNING
PHI following up on d/c planning for the patient. She is from home with her  originally, [facility] and [address] [place] [address]. She is in CCU at this time.   Per report, the patient's  would like to t

## 2017-08-18 NOTE — CONSULTS
[facility] REPORT    Jesse Nascimento  EUY:[birth year]  YYB:[id number]  LOS:1    Date of Admission:  8/17/2017  Date of Consult:  8/18/2017     Reason for Consultation: perineal wound       History of Present Illness:  Tyrone Ramirez any smokeless tobacco history on file. She reports that she drinks alcohol. She reports that she does not use drugs.      Allergies:  No Known Allergies    Medications:     Current Facility-Administered Medications:   •  aspirin EC EC tab 325 mg, 325 mg, Or malaise  Eyes: negative  Ears, nose, mouth, throat, and face: negative  Respiratory: positive for dyspnea on exertion  Cardiovascular: negative  Gastrointestinal: negative  Genitourinary:positive for lechuga  Integument/breast: positive for large perineal wo CA 6.2 08/18/2017   ALB 2.2 08/17/2017   TP 4.4 08/17/2017   TROP 0.24 08/18/2017       Mri Brain (w+wo) (cpt=70553)    Result Date: 8/18/2017  CONCLUSION:  1. 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)     Drug rash     Pruritic rash     Leukopenia due to antineoplastic chemotherapy ([facility]

## 2017-08-18 NOTE — OCCUPATIONAL THERAPY NOTE
Chart reviewed. Patient is positive for bilateral LE acute DVTs. She is being given subcutaneous heparin only due to hemoptysis. Spoke with Dr Eligio Abrams who approved of EOB and in bed activity at this time. Attempted to see patient for OT evaluation.  She w

## 2017-08-18 NOTE — CONSULTS
Pulmonary Consult     Assessment / Plan:  1. Acute respiratory failure - intubated for airway protection. Extubated  - wean O2 as able  2. Septic shock - unclear etiology  - vanc/cefepime. Per ID  - wean stress dose steroids  3.  Encephalopathy - concern fo amount over port 30 minutes prior to port access Disp: 30 g Rfl: 3 Past Month at Unknown time   dexamethasone (DECADRON) 4 MG tablet Take one tablet by mouth every 6 hours Disp: 60 tablet Rfl: 1 More than a month at Unknown time   HYDROcodone-acetaminophen History  Social History   Marital status: Single  Spouse name: N/A    Years of education: N/A  Number of children: N/A     Occupational History  Tech support pharm software       Social History Main Topics   Smoking status: Former Smoker  1.50 Packs/day  F

## 2017-08-18 NOTE — PHYSICAL THERAPY NOTE
Attempted physical therapy evaluation. Per RN, patient is not appropriate for physical therapy.  Will re-attempt on 8/19

## 2017-08-18 NOTE — CONSULTS
[facility] Patient Status:  Inpatient    1965 MRN [id number]   Location [facility] 2W/SW Attending Ana Ken MD   Hosp Day # 1 PCP Tracey Hilario MD     Date of Cons Patient appears overall comfortable. She is drowsy, but alert and oriented and appears decisional. She has flat affect and kept her eyes closed during much of today's visit, but would participate when asked.  Breathing appears non-labored currently on RA wi when appropriate. We discussed current clinical condition and overall poor prognosis per MD's. I discussed the normal disease trajectory of metastatic cancer with associated symptoms and decline over time.  Alcides Duverney tells me they understand about her metastati talk amongst themselves before making any decisions on code status. I left a blank POLST form with  for review. I discussed need for ongoing [address] discussions with PC and MDs. Pt will remain full code for now with no limits set today.        Medical Hi 650 mg, 650 mg, Oral, Q6H PRN  •  ondansetron HCl (ZOFRAN) injection 4 mg, 4 mg, Intravenous, Q6H PRN  •  Normal Saline Flush 0.9 % injection 3 mL, 3 mL, Intravenous, PRN  •  morphINE sulfate (PF) 2 MG/ML injection 1 mg, 1 mg, Intravenous, Q2H PRN **OR** m 8/18/2017  CONCLUSION:  1. Multifocal enhancing masses involving both cerebral hemispheres, the right lateral cerebellum, and the upper cervical spinal cord, most consistent with multifocal intracranial/intraspinal metastatic disease.  Notably, nearly all o kg/m². Present Level of pain: denies  Non-verbal signs of pain present:  NO    Physical Exam:  General: Drowsy and in no apparent distress. HEENT: No focal deficits. +dry lips  Cardiac: Regular rate and rhythm, S1, S2 normal, no murmur, rub or gallop.   L care following.   -I did provide overview of hospice benefit for future reference to pt/family today.      Advance care planning  -Copy of HCPOA on chart,  Edson Richards is [facility] #[phone]    Palliative Performance Scale 10%  -Metastatic small cell lung c

## 2017-08-18 NOTE — PROGRESS NOTES
ASSESSMENT/PLAN:    Impression: 1. Elevated troponin in a 29-year-old female with respiratory failure and metastatic lung cancer and mild ST segment changes on her EKG. There is no prior cardiac history. She was a long-term smoker.   Her last echo ear SYSTEMS:   CONS:denies fever, chills, significant weight change. CV:see HPI. RESP:denies chronic cough, hemoptysis. GI:denies melena, hematochezia. :denies hematuria. INTEG:no new rashes. MS:no limiting arthritis. NEURO:no localized deficits.  HEME/LYMPH:

## 2017-08-19 NOTE — OCCUPATIONAL THERAPY NOTE
OCCUPATIONAL THERAPY EVALUATION - INPATIENT     Room Number: 204/204-A  Evaluation Date: 8/19/2017  Type of Evaluation: Initial  Presenting Problem:  (Unresponsiveness)    Physician Order: IP Consult to Occupational Therapy  Reason for Therapy: ADL/IADL Dy Diagnosis Date   • Lung cancer [facility])        Past Surgical History  Past Surgical History:  No date:   2016: COLONOSCOPY,BIOPSY N/A      Comment: Procedure: COLONOSCOPY, POSSIBLE BIOPSY,                POSSIBLE POLYPECTOMY 75253;  Surgeon: Pritesh Judd intact  Sharp/Dull:  intact    Communication: Responding appropriately    Behavioral/Emotional/Social: Cooperative    RANGE OF MOTION   Left Upper extremity ROM is within functional limits  Right Upper extremity PROM is within functional limits   - provide minutes with mod assist   Comment:       Goals  on:  17  Frequency: 3x/week    M.  75 [address]

## 2017-08-19 NOTE — PROGRESS NOTES
[facility]  Progress Note    Alex Hinders Pelto Patient Status:  Inpatient    1965 MRN [id number]   Location [facility] 2W/SW Attending Cam Brock MD   Hosp Day # 2 PCP Leopold Kehr, MD     Subjective:  No events. Very tired.  Danielle Bardales brain  -No active therapy as inpatient     2. Brain mets  -Was receiving palliative re-irradiation  -No active therapy as inpatient     3. ?Seizure?  -No current concerns  -observe     4. Respiratory faiulre  -Extubated  -Stable  -per pulm     5.  DVT  -Has

## 2017-08-19 NOTE — PROGRESS NOTES
[facility] AND CLINICS  Progress Note    Lai Nascimento Patient Status:  Inpatient    1965 MRN [id number]   Location [facility] 2W/SW Attending Jasper Varma MD   Hosp Day # 1 PCP Juliet Piña MD     Subjective:  No events. Very tired.  Luis Alberto

## 2017-08-19 NOTE — PHYSICAL THERAPY NOTE
PHYSICAL THERAPY EVALUATION - INPATIENT     Room Number: 204/204-A  Evaluation Date: 8/19/2017  Type of Evaluation: Initial  Physician Order: PT Eval and Treat    Presenting Problem:  (weakness, confusion)  Reason for Therapy: Mobility Dysfunction and Dale Jose COLONOSCOPY,BIOPSY N/A      Comment: Procedure: COLONOSCOPY, POSSIBLE BIOPSY,                POSSIBLE POLYPECTOMY 62841;  Surgeon: Jasmina Simental MD;  Location: [facility]: OTHER SURGICAL HISTORY Right Assistance: Not tested    Bed Mobility: NT    Transfers: NT    Exercise/Education Provided:  ROM  Strengthening   All 4 extremities    Patient End of Session: In bed    CURRENT GOALS    Goals to be met by: 8/26  Patient Goal Patient's self-stated goal is:

## 2017-08-19 NOTE — PROGRESS NOTES
[facility] AND [facility] Infectious Disease Progress Note    Velasquez Nascimento Patient Status:  Inpatient    1965 MRN [id number]   Location [facility] 2W/SW Attending Ceferino Thomas MD   Hosp Day # 2 PCP Murtaza Hernández MD     Subjective:  Yusuf Millán de Yécora famoTIDine (PEPCID) injection 20 mg, 20 mg, Intravenous, BID  •  Cefepime HCl (MAXIPIME) 1 g in sodium chloride 0.9% 100 mL IVPB-MBP, 1 g, Intravenous, Q8H  •  levETIRAcetam (KEPPRA) 500 mg in sodium chloride 0.9 % 100 mL IVPB, 500 mg, Intravenous, Q12H brain mets and recent carbapenem use  - PCT 0.16, UA abn, Cul are NGTD.   - Will follow blood cultures, NGTD.   - Pneumonitis a possibility with some L sided CXR changes  - No fevers, no leukocytosis  - Now extubated  - Will continue IV cefepime and vancomy

## 2017-08-19 NOTE — PROGRESS NOTES
[facility] Hospitalist Progress Note     CC: Hospital Follow up    PCP: Zack Aj MD       Assessment/Plan:     Active Problems:    Altered mental status    Goals of care, counseling/discussion    Advance care planning    Ms. Nascimento is an unfortunate 51 yo F transfused at OSH ->  9.1 here on admit  - heparin gtt stopped this AM due to drop in Hg and hemoptysis, started on subQ heparin for DVT, will continue for now pending stable Hg, repeat dopplers      Metastatic small cell lung cancer  - widely metastatic t (36.7 °C)] 97.8 °F (36.6 °C)  Pulse:  [63-77] 77  Resp:  [15-20] 20  BP: (109-123)/(75-87) 123/82      Intake/Output:    Intake/Output Summary (Last 24 hours) at 08/19/17 1451  Last data filed at 08/19/17 0634   Gross per 24 hour   Intake             6605 intracranial/intraspinal metastatic disease. Notably, nearly all of the metastatic lesions demonstrate intrinsic diffusion restriction, suggesting cellularity.  The largest lesions in the right occipital, left frontal/parietal lobes, and upper cervical cord There is associated scattered compressive atelectasis. 6. Suspected hepatic metastatic disease. 7. Multifocal osseous metastatic disease throughout the visualized axial skeleton.   8. Substantial asymmetry of the right obturator internus musculature may r

## 2017-08-19 NOTE — PLAN OF CARE
SKIN/TISSUE INTEGRITY - ADULT    • Incision(s), wounds(s) or drain site(s) healing without S/S of infection Not Progressing    Pt refusing routine turns, skin care and wound care. Specialty mattress in place.

## 2017-08-19 NOTE — PLAN OF CARE
PAIN - ADULT    • Verbalizes/displays adequate comfort level or patient's stated pain goal Progressing        SKIN/TISSUE INTEGRITY - ADULT    • Incision(s), wounds(s) or drain site(s) healing without S/S of infection Progressing        Buttock dressing ch

## 2017-08-19 NOTE — PROGRESS NOTES
[facility]    General Surgery Progress Note  Sergei Kwon  NF  # 2       Subjective:   Notes reviewed and d/w RN. Pt is refusing all treatment.  RN able to do dressing change but pt refused CT scan and swallow eval this AM christopher 6. 3*  6.2*  6.0*   NA  137  139  142   K  2.8*  4.0  3.1*   CL  106  111*  114*   CO2  28  25  25         Lab Results  Component Value Date   WBC 7.4 08/19/2017   HGB 9.2 08/19/2017   HCT 27.8 08/19/2017   PLT 66 08/19/2017    08/19/2017   K 3.1 08/1

## 2017-08-19 NOTE — SLP NOTE
Pt refused to sit upright for any PO intake for Re-BSSE. Pt to remain NPO at this time. Will follow 8/20 as able pending patient's ability to participate. RN aware.      Thank you,  Mu Moreno MA, [address] [facility], [place]  Speech-Language Pathologist  Miguel Angel Maynard

## 2017-08-19 NOTE — CONSULTS
[facility]  Report of Consultation    Ronak Jewellliza Patient Status:  Inpatient    1965 MRN [id number]   Location [facility] 2W/SW Attending Micheal Best MD   Hosp Day # 2 PCP Claude Parker MD     Reason for Consultation:  Natasha Felton Procedure: COLONOSCOPY, POSSIBLE BIOPSY,                POSSIBLE POLYPECTOMY 43941;  Surgeon: Darrell Epstein MD;  [facility] OTHER SURGICAL HISTORY Right      Comment: shoulder (snowmobiling) 3350 (MIRALAX) powder packet 17 g, 17 g, Oral, Daily PRN  •  magnesium hydroxide (MILK OF MAGNESIA) 400 MG/5ML suspension 30 mL, 30 mL, Oral, Daily PRN  •  bisacodyl (DULCOLAX) rectal suppository 10 mg, 10 mg, Rectal, Daily PRN  •  FLEET ENEMA (FLEET) 7-19 08/18/17  0634 08/19/17  0510    139 142   K 2.8* 4.0 3.1*    111* 114*   CO2 28 25 25   BUN 7* 6* 7*   CREATSERUM 0.31* 0.28* 0.31*   CA 6.3* 6.2* 6.0*   MG 1.8  --   --        Recent Labs   08/17/17  1737 08/19/17  0510 08/19/17  0742   ALT 3

## 2017-08-20 NOTE — PLAN OF CARE
Problem: Patient/Family Goals  Goal: Patient/Family Long Term Goal  Patient's Long Term Goal: To feel less pain     Interventions:  - Follow med plan  -Palliative care   - See additional Care Plan goals for specific interventions    Outcome: Progressing

## 2017-08-20 NOTE — SLP NOTE
SPEECH DAILY NOTE - INPATIENT    Evaluation Date: 08/20/17    ASSESSMENT & PLAN   ASSESSMENT  Pt seen upright in bed, just shy of 90 degree angle (what pt was able to tolerate).  Pt accepted SLP led trials of nectar thick liquids via tsp and up, puree and m

## 2017-08-20 NOTE — PROGRESS NOTES
KARAN NEWBY [facility]    General Surgery Progress Note  Uday Myers  [facility]:[id number]  HD# 3       Subjective:   No unusual complaints, denies abdominal pain, nausea and vomiting  Passing flatus and BM(s)      Exam:     General: looks depressed  Pulmon >60   GFRNAA  >60  >60  >60   CA  6.2*  6.0*  5.7*   NA  139  142  143   K  4.0  3.1*  3.2*   CL  111*  114*  114*   CO2  25  25  23         Lab Results  Component Value Date   WBC 6.1 08/20/2017   HGB 8.7 08/20/2017   HCT 26.0 08/20/2017   PLT 64 08/20/20

## 2017-08-20 NOTE — PROGRESS NOTES
[facility] AND [facility] Infectious Disease Progress Note    Bg Nascimento Patient Status:  Inpatient    1965 MRN [id number]   Location [facility] 4W/SW/SE Attending Kamille Randle MD   Hosp Day # 3 PCP Daren Van MD     Subjective: Oral, Daily PRN  •  bisacodyl (DULCOLAX) rectal suppository 10 mg, 10 mg, Rectal, Daily PRN  •  FLEET ENEMA (FLEET) 7-19 GM/118ML enema 133 mL, 1 enema, Rectal, Once PRN  •  famoTIDine (PEPCID) injection 20 mg, 20 mg, Intravenous, BID  •  Cefepime HCl (MAX  Sepsis/septic shock with unresponsive state at [facility] - improved  - Source unclear with cultures at outside hospital negative  - Possible seizure per records - patient with brain mets and recent carbapenem use  - PCT 0.16, UA abn, Cul are NGTD.   - Dashawn contreras

## 2017-08-20 NOTE — PROGRESS NOTES
[facility] dosing service    Follow-up Pharmacokinetic Consult for Vancomycin Dosing     Blanquita Granados is a 50year old female admitted on 8/17/17 who is being treated for sepsis.    Patient is on day 4 of Vancomycin at this hospital, current dose 1750mg Partially loculated moderate left pleural effusion. Small right pleural effusion. There is associated scattered compressive atelectasis. 6. Suspected hepatic metastatic disease.    7. Multifocal osseous metastatic disease throughout the visualized axial s

## 2017-08-20 NOTE — PROGRESS NOTES
[facility]  Cardiology Progress Note    Ragena Res Pelto Patient Status:  Inpatient    1965 MRN [id number]   Location [facility] 2W/SW Attending Yo Bazan MD   Hosp Day # 3 PCP Johnathon Lozano MD     Assessment:  Positive troponin potassium chloride  40 mEq Intravenous Q4H   • magnesium sulfate  2 g Intravenous Once   • calcium gluconate  2 g Intravenous Once   • aspirin EC  325 mg Oral Daily   • metoprolol Tartrate  5 mg Intravenous Q6H   • Heparin Sodium (Porcine)  5,000 Units Sub

## 2017-08-20 NOTE — PHYSICAL THERAPY NOTE
PHYSICAL THERAPY TREATMENT NOTE - INPATIENT    Room Number: 656/628-Y       Presenting Problem:  (weakness, confusion)    Problem List  Active Problems:    Altered mental status    Goals of care, counseling/discussion    Advance care planning      ASSESSM Unable to rate  Location: R shoulder, appeared to not have sublux  Management Techniques: Activity promotion; Body mechanics;Repositioning    BALANCE

## 2017-08-20 NOTE — PROGRESS NOTES
[facility] AND CLINICS  Progress Note    Marquis Gissell Nascimento Patient Status:  Inpatient    1965 MRN [id number]   Location [facility] 2W/SW Attending Parvin Kang MD   Hosp Day # 3 PCP Brian Cabrera MD     Subjective:  No events. Still tired.  Francisco J Lepe concerns. Gracia Malcolm.  Franc Dillon MD  Hematology and Oncology  [facility]

## 2017-08-20 NOTE — PROGRESS NOTES
[facility] Hospitalist Progress Note     CC: Hospital Follow up    PCP: Claude Parker MD       Assessment/Plan:     Active Problems:    Altered mental status    Goals of care, counseling/discussion    Advance care planning    Ms. Nascimento is an unfortunate 51 yo F stable  - Hg 7.2 this AM at OSH -> 1 unit pRBC transfused at OSH ->  9.1 here on admit  - heparin gtt stopped this AM due to drop in Hg and hemoptysis, started on subQ heparin for DVT, will continue for now pending stable Hg, repeat dopplers     Hypocalcem Not much pain now, toradol did help with pain. Feels hungry, wants to eat. Able to sit up at side of bed with therapy today. Willing to work with SLP today.      OBJECTIVE:    Blood pressure 131/86, pulse 73, temperature (!) 96.5 °F (35.8 °C), temperatu Lab  08/17/17   1737  08/19/17   0510  08/19/17   0742   ALT  36   --    --    AST  25   --    --    ALB  2.2*  2.0*  2.0*         Imaging:  Mri Brain (w+wo) (cpt=70553)    Result Date: 8/18/2017  CONCLUSION:  1. Multifocal enhancing masses involving bot left perihilar mass in the mediastinum and completely encasing/occluding the left main pulmonary artery. 3. Dilatation of the main pulmonary artery trunk may relate to underlying pulmonary hypertension.   4. Masslike areas of consolidation which may reflec • sodium chloride 75 mL/hr at 08/19/17 1859     ketorolac (TORADOL) injection, Normal Saline Flush, acetaminophen, ondansetron HCl, Normal Saline Flush, morphINE sulfate **OR** morphINE sulfate **OR** morphINE sulfate, PEG 3350, magnesium hydroxide, bi

## 2017-08-21 NOTE — SLP NOTE
SPEECH DAILY NOTE - INPATIENT    Evaluation Date: 08/20/17    ASSESSMENT & PLAN   ASSESSMENT  Pt seen for swallowing therapy to monitor swallowing tolerance and train swallowing precautions. Pt agreed to be positioned to a sitting posture in bed.   Pt's hu PROGRESSING   Goal #2 The patient/family/caregiver will demonstrate understanding and implementation of aspiration precautions and swallow strategies independently over 2-3 session(s). Educated pt and her  on swallowing precautions.   The pt's hu

## 2017-08-21 NOTE — PROGRESS NOTES
Pulmonary Progress Note     Assessment / Plan:  1. Acute respiratory failure - intubated for airway protection. Extubated, resolved. CXR yesterday with worsening pleural effusion and atelectasis  - currently on RA  - start ezpap  2.  Septic shock - unclear

## 2017-08-21 NOTE — CONSULTS
[place] [facility]  Palliative Care Follow Up    Tyrone Mcmahon Pelto Patient Status:  Inpatient    1965 MRN [id number]   Location [facility] 4W/SW/SE Attending Marta Mosqueda MD   Hosp Day # 4 PCP Elgin Mott MD     Date of Consult completed with these wishes and original given to  and copy made for record.     Review of Systems:  Pertinent items are noted in subjective    Allergies:  No Known Allergies    Medications:     Current Facility-Administered Medications:   •  Sodium ENEMA (FLEET) 7-19 GM/118ML enema 133 mL, 1 enema, Rectal, Once PRN  •  famoTIDine (PEPCID) injection 20 mg, 20 mg, Intravenous, BID  •  Cefepime HCl (MAXIPIME) 1 g in sodium chloride 0.9% 100 mL IVPB-MBP, 1 g, Intravenous, Q8H  •  levETIRAcetam (KEPPRA) 5 Regular rate and rhythm, S1, S2 normal, no murmur, rub or gallop. Lungs: Clear without wheezes, rales, rhonchi or dullness. Normal excursions and effort.   Abdomen: Soft, non-tender, normal bowel sounds X 4 quadrants, no rebound or guarding  Extremities: #[phone]  -Completed POLST form in chart    Palliative Performance Scale 30%  -Metastatic small cell lung ca with poor PS, enlarging brain mets on imaging,  sepsis, victor manuel's gangrene, DVT, significant functional decline with bed bound status and dep

## 2017-08-21 NOTE — PROGRESS NOTES
[facility] dosing service    Follow-up Pharmacokinetic Consult for Vancomycin Dosing     Skip Nguyen is a 50year old female who is being treated for sepsis. Patient is on day 5 of Vancomycin 1.75 gm IV Q 8 hours. Goal trough is 15-20 ug/mL.     She weight and renal function) due to rapid accumulation on Q 8 hour interval.     2.  Pharmacy will re-check Vancomycin trough levels prior to 5th dose. Goal trough level 15-20 ug/mL.     3.  Pharmacy will need BUN/Scr daily while on Vancomycin to assess jules

## 2017-08-21 NOTE — PROGRESS NOTES
[facility] Hospitalist Progress Note     CC: Hospital Follow up    PCP: Jevon Campoverde MD       Assessment/Plan:     Active Problems:    Altered mental status    Goals of care, counseling/discussion    Advance care planning    Ms. Nascimento is an unfortunate 51 yo F heparin  - consult oncology, continue present management; will hold hep sq if PLT < 50  - Pulm discussed with family, who would not want additional procedures such as IVC filter     Acute blood loss anemia - stable  - Hg 7.2 this AM at OSH -> 1 unit pRBC t Anette Carty MD  [facility] Hospitalist  Answering Service number: [phone]    Time spent with patient: 35 mins with > 50% spent counseling patient face to face     Subjective:     Felt short of breath suddenly last night, not hypoxic.  CXR with L pleural effusi GLU  84  93   --   111*   BUN  7*  6*   --   5*   CREATSERUM  0.31*  0.28*   --   0.42*   GFRAA  >60  >60   --   >60   GFRNAA  >60  >60   --   >60   CA  6.0*  5.7*   --   6.0*   NA  142  143   --   141   K  3.1*  3.2*  3.7  3.5   CL  114*  114*   --   11 suggest possible cardiac myopathy although can't exclude pericardial effusion. Consider echocardiogram if not recently performed. 2. Worsening left effusion with secondary worsening of left lung atelectasis.  Areas of underlying consolidation and/or mass/tu

## 2017-08-21 NOTE — PROGRESS NOTES
[facility] AND [facility] Infectious Disease  Progress Note    Yara Spencer Nascimento Patient Status:  Inpatient    1965 MRN [id number]   Location [facility] 4W/SW/SE Attending Silvia Parra MD   Hosp Day # 4 PCP Sammie Del Cid MD     Subjective: demonstrate moderate surrounding vasogenic edema. Please also note that   given the marked diffusion restriction of these lesions as well as surrounding T2 hypointense capsule, multifocal cerebral abscesses could have a similar imaging appearance.  This is,

## 2017-08-21 NOTE — PROGRESS NOTES
[facility] AND [facility]  Progress Note    Nathan Nascimento Patient Status:  Inpatient    1965 MRN [id number]   Location [facility] 2W/SW Attending Laurel Kaba MD   Hosp Day # 4 PCP Dee Feliz MD     Subjective:  No events. Tired. Hungry. concerns. Tari Marcano.  Maura Driver MD  Hematology and Oncology  [facility]

## 2017-08-21 NOTE — PHYSICAL THERAPY NOTE
PHYSICAL THERAPY TREATMENT NOTE - INPATIENT    Room Number: 457/457-A       Presenting Problem:  (weakness, confusion)    Problem List  Active Problems:    Altered mental status    Goals of care, counseling/discussion    Advance care planning      ASSESSM a bed to a chair (including a wheelchair)?: Total   -   Need to walk in hospital room?: Total   -   Climbing 3-5 steps with a railing?: A Lot    AM-PAC Score:  Raw Score: 8   PT Approx Degree of Impairment Score: 86.62%   Standardized Score (AM-PAC Scale):

## 2017-08-22 NOTE — PROGRESS NOTES
Pulmonary Progress Note      NAME: Bg Kang [facility] - ROOM: 272/148-T - MRN: [id number] - Age: 50year old - : 1965    Assessment/Plan:  1. Acute respiratory failure - intubated for airway protection. Extubated.  Resolved  - currently on RA  - cont ezp (Oral)   Resp 20   Ht 5' 5.75\" (1.67 m)   Wt 216 lb 12.8 oz (98.3 kg)   LMP  (Within Years)   SpO2 98%   BMI 35.26 kg/m²   Physical Exam:   General: alert, cooperative, no respiratory distress. HEENT: Normocephalic atraumatic. Lips, mucosa, and tongue no

## 2017-08-22 NOTE — CONSULTS
[place] [facility] - [facility]  Palliative Care Follow Up    Doug Nascimento Patient Status:  Inpatient    1965 MRN [id number]   Location [facility] 4W/SW/SE Attending Reuben Ambriz MD   Hosp Day # 5 PCP Claribel Grady MD     Date of Consult Normal Saline Flush 0.9 % injection 3 mL, 3 mL, Intravenous, PRN  •  Heparin Sodium (Porcine) 5000 UNIT/ML injection 5,000 Units, 5,000 Units, Subcutaneous, Q8H [address]  •  acetaminophen (TYLENOL) tab 650 mg, 650 mg, Oral, Q6H PRN  •  ondansetron HCl (ZOFRAN) 08/18/2017       Imaging:  Xr Chest Ap Portable  (cpt=71010)    Result Date: 8/20/2017  CONCLUSION:  1. Cardiomegaly. Globular appearance, nonspecific. Findings suggest possible cardiac myopathy although can't exclude pericardial effusion.  Consider echocar g-tube, continue supportive care    Spiritual needs addressed: Patient/family declined Spiritual Care    Disposition: SNF palliative care      Procedures:  No intubation  No g-tube      Assessment/Recommendations:   Altered mental status  -Improved    Sepsi

## 2017-08-22 NOTE — PROGRESS NOTES
[facility] Hospitalist Progress Note     CC: Hospital Follow up    PCP: Andre Rocha MD       Assessment/Plan:     Active Problems:    Altered mental status    Goals of care, counseling/discussion    Advance care planning    Ms. Nascimento is an unfortunate 49 yo F continue present management; will hold hep sq if PLT < 50  - Pulm discussed with family, who would not want additional procedures such as IVC filter     Acute blood loss anemia - stable  - Hg 7.2 this AM at OSH -> 1 unit pRBC transfused at OSH ->  9.1 -> 8 feels better, today, no nausea or vomiting no chest pain or sob    OBJECTIVE:    Blood pressure 128/83, pulse 78, temperature 97.7 °F (36.5 °C), temperature source Oral, resp. rate 16, height 5' 5.75\" (1.67 m), weight 216 lb 12.8 oz (98.3 kg), SpO2 99 %. --   110  113*   CO2  23   --   24  27       No results for input(s): ALT, AST, ALB, AMYLASE, LIPASE, LDH in the last 72 hours.     Invalid input(s): ALPHOS, TBIL, DBIL, TPROT      Imaging:  Xr Chest Ap Portable  (cpt=71010)    Result Date: 8/20/2017  CONCL

## 2017-08-22 NOTE — PROGRESS NOTES
[facility] AND CLINICS  Progress Note    Kateryna Nascimento Patient Status:  Inpatient    1965 MRN [id number]   Location [facility] 2W/SW Attending Marni Savage MD   Hosp Day # 5 PCP Nimo Muñiz MD     Subjective:  Denies pain.   Met with pal

## 2017-08-22 NOTE — DISCHARGE PLANNING
8/22CM-The Patient insurance has [facility]. This Writer is checking with the Patient's insurance in regards to her appropriate discharging planning. Case Management will update as information becomes available.      -[facility] [id number]

## 2017-08-22 NOTE — PROGRESS NOTES
[facility] AND [facility] Infectious Disease  Progress Note    Bertha Nascimento Patient Status:  Inpatient    1965 MRN [id number]   Location [facility] 4W/SW/SE Attending Erik Altman MD   Hosp Day # 5 PCP Randi Loza MD     Subjective:  Patient upper cervical cord also demonstrate moderate surrounding vasogenic edema.  Please also note that   given the marked diffusion restriction of these lesions as well as surrounding T2 hypointense capsule, multifocal cerebral abscesses could have a similar dora

## 2017-08-23 NOTE — PROGRESS NOTES
Pulmonary Progress Note     Assessment / Plan:  1. Acute respiratory failure - intubated for airway protection. Extubated. Resolved  - currently on RA  - stop ezpap  - IS  2. Septic shock - unclear etiology.  resolved  - abx per ID and stress dose steroids

## 2017-08-23 NOTE — PROGRESS NOTES
[facility] Pharm[facility] dosing service    Follow-up Pharmacokinetic Consult for Vancomycin Dosing     Marisol Meneses is a 50year old female who is being treated for sepsis. Patient is on day 7 of Vancomycin 2 gm IV Q 12 hours. Goal trough is 15-20 ug/mL.     She ha pharmacokinetics, 98.3 kg weight and renal function) Vancomycin trough level elevated despite dose reduction. 2.  Pharmacy will re-check Vancomycin random level in 24 hours. Goal trough level 15-20 ug/mL.     3.  Pharmacy will need BUN/Scr daily while

## 2017-08-23 NOTE — PROGRESS NOTES
[facility] AND [facility] Infectious Disease  Progress Note    Bertha Nascimento Patient Status:  Inpatient    1965 MRN [id number]   Location [facility] 4W/SW/SE Attending Erik Altman MD   Hosp Day # 6 PCP Randi Loza MD     Subjective:  Patient cellularity. The largest lesions in the right occipital, left frontal/parietal lobes, and upper cervical cord also demonstrate moderate surrounding vasogenic edema.  Please also note that   given the marked diffusion restriction of these lesions as well as

## 2017-08-23 NOTE — CONSULTS
[facility]    Report of Consultation    Shelly Nascimento Patient Status:  Inpatient    1965 MRN [id number]   Location [facility] 4W/SW/SE Attending Varsha Huddleston MD   Hosp Day # 6 PCP Johnathon Lozano MD     Date of Admission:   does not use drugs.     Allergies:  No Known Allergies    Medications:    Current Facility-Administered Medications:   •  Heparin Sodium (Porcine) 5000 UNIT/ML injection 5,000 Units, 5,000 Units, Subcutaneous, 2 times per day  •  [START ON 8/24/2017] Jan sodium chloride 0.9 % 100 mL IVPB, 500 mg, Intravenous, Q12H    Review of Systems:  Pertinent items are noted in HPI.     Physical Exam:  Lungs: normal percussion bilaterally              No JVD             Mild leg swelling    Laboratory Data:    Lab Resul

## 2017-08-23 NOTE — DISCHARGE PLANNING
8/23CM-The Patient's IHP  informed this Writer that the Patient is not appropriate and doesn't have a skilled need to return to a SNF because she was not participating. If the Patient required IV antibiotics that can be done at home with [facility].

## 2017-08-23 NOTE — OCCUPATIONAL THERAPY NOTE
OCCUPATIONAL THERAPY TREATMENT NOTE - INPATIENT     Room Number: 457/457-A          Presenting Problem:  (Unresponsiveness)    Problem List  Active Problems:    Altered mental status    Goals of care, counseling/discussion    Advance care planning      ASS 74.7%  Standardized Score (AM-PAC Scale): 27.31  CMS Modifier (G-Code): CL    FUNCTIONAL TRANSFER ASSESSMENT  Supine to Sit : Not tested  Sit to Stand: Not tested    Toilet Transfer: NT  Shower Transfer: NT  Chair Transfer: Pt refused    Bedroom Mobility:

## 2017-08-23 NOTE — OCCUPATIONAL THERAPY NOTE
OCCUPATIONAL THERAPY TREATMENT NOTE - INPATIENT     Room Number: 457/457-A          Presenting Problem:  (Unresponsiveness)    Problem List  Active Problems:    Altered mental status    Goals of care, counseling/discussion    Advance care planning      ASS Total  -   Bathing (including washing, rinsing, drying)?: Total  -   Toileting, which includes using toilet, bedpan or urinal? : Total  -   Putting on and taking off regular upper body clothing?: A Lot  -   Taking care of personal grooming such as brushing

## 2017-08-23 NOTE — SLP NOTE
Attempted to see patient x2 today but patient with PT then with MD.  Will attempt to see later today or tomorrow.   Andra Elliott MA/BROOKE-SLP  Speech Language Pathologist  [place]. [phone]

## 2017-08-23 NOTE — CONSULTS
[place] [facility] - [facility]  Palliative Care Follow Up    Bertha Nascimento Patient Status:  Inpatient    1965 MRN [id number]   Location [facility] 4W/SW/SE Attending Cami Navarro MD   Hosp Day # 6 PCP Randi Loza MD     Date of Consult Q6H PRN  •  ipratropium-albuterol (DUONEB) nebulizer solution 3 mL, 3 mL, Nebulization, Q6H PRN  •  aspirin EC EC tab 325 mg, 325 mg, Oral, Daily  •  metoprolol Tartrate (LOPRESSOR) 5 MG/5ML injection 5 mg, 5 mg, Intravenous, Q6H  •  Normal Saline Flush 0.  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 May still use Morphine IVP PRN if Roxanol is

## 2017-08-23 NOTE — DIETARY NOTE
ADULT NUTRITION INITIAL ASSESSMENT    Pt is at moderate nutrition risk. Pt does not meet malnutrition criteria.       RECOMMENDATIONS TO MD:  RD to order and manage ONS (oral nutritional supplements)     NUTRITION DIAGNOSIS/PROBLEM:  Inadequate oral intake 0600 97.9 kg (215 lb 12.8 oz)   08/17/17 1800 98.2 kg (216 lb 6.4 oz)   Wt Readings from Last 20 Encounters:  08/19/17 : 98.3 kg (216 lb 12.8 oz)  08/17/17 : 98.2 kg (216 lb 6.4 oz)  07/10/17 : 99.8 kg (220 lb)  07/06/17 : 99.8 kg (220 lb)  06/30/17 : 100. 290 295  --  296*   < > = values in this interval not displayed.     NUTRITION RELATED PHYSICAL FINDINGS:  - Body Fat/Muscle Mass: obese with edema, difficult to assess.     - Fluid Accumulation: upper extremity edema and lower extremity edema per visual ex

## 2017-08-23 NOTE — PHYSICAL THERAPY NOTE
PHYSICAL THERAPY TREATMENT NOTE - INPATIENT    Room Number: 457/457-A       Presenting Problem:  (weakness, confusion)    Problem List  Active Problems:    Altered mental status    Goals of care, counseling/discussion    Advance care planning      ASSESSM Patient is returned to bed at end of session and reports she feels better after moving around. She is aware she will be up to chair and this will help strengthen her to allow increased mobility. All needs in reach and family in room.        DISCHARGE RECOMM Impairment Score: 92.36%   Standardized Score (AM-PAC Scale): 26.42   CMS Modifier (G-Code): CM    FUNCTIONAL ABILITY STATUS  Gait Assessment   Gait Assistance: Dependent assistance (two attempts at sit to stand at [facility])           Stoop/Curb Assistance: Not

## 2017-08-23 NOTE — PROGRESS NOTES
[facility] Hospitalist Progress Note     CC: Hospital Follow up    PCP: Randi Loza MD       Assessment/Plan:     Active Problems:    Altered mental status    Goals of care, counseling/discussion    Advance care planning    Ms. Nascimento is an unfortunate 49 yo F intermittently using IV morphine, tramadol ordered  - roxanol started per palliative    Unresponsiveness- improved  - possibly due to seizure activity per OSH, seen by neuro at that time, had negative EEG  - started on prophylactic keppra, will continue  - while in house     Patient and/or patient's family given opportunity to ask questions and note understanding and agreeing with therapeutic plan as outlined     Ryan Abel MD  [facility] Hospitalist  Answering Service number: [phone]     Subjective:     Rodger 08/22/17   1710  08/23/17   0519   GLU  111*  125*   --   111*   BUN  5*  6*   --   6*   CREATSERUM  0.42*  0.44*   --   0.50   GFRAA  >60  >60   --   >60   GFRNAA  >60  >60   --   >60   CA  6.0*  5.8*   --   6.3*   NA  141  143   --   144   K  3.5  3.0*

## 2017-08-23 NOTE — WOUND PROGRESS NOTE
WOUND CARE NOTE      PLAN   Recommendations:  ID currently on consult  Dietary consult for recommendations for nutrition to optimize wound healing  Turn schedules  Specialty bed: First step overlay; pt refused  Heels elevated using pillows, heel wedge or

## 2017-08-24 NOTE — PROGRESS NOTES
[facility] Hospitalist Progress Note     CC: Hospital Follow up    PCP: Andre Rocha MD       Assessment/Plan:     Active Problems:    Altered mental status    Goals of care, counseling/discussion    Advance care planning    Ms. Nascimento is an unfortunate 51 yo F on imaging  - intermittently using IV morphine, tramadol ordered  - roxanol started per palliative    Unresponsiveness- improved  - possibly due to seizure activity per OSH, seen by neuro at that time, had negative EEG  - started on prophylactic keppra, wi follow patient while in house     Patient and/or patient's family given opportunity to ask questions and note understanding and agreeing with therapeutic plan as outlined     Edie Serna MD  [facility] Hospitalist  Answering Service number: [phone]     Subject 08/22/17   1710  08/23/17   0519  08/24/17   0408   GLU  125*   --   111*  108*   BUN  6*   --   6*  4*   CREATSERUM  0.44*   --   0.50  0.49*   GFRAA  >60   --   >60  >60   GFRNAA  >60   --   >60  >60   CA  5.8*   --   6.3*  6.9*   NA  143   --   144  145

## 2017-08-24 NOTE — DISCHARGE PLANNING
8/24CM-The Patient's [facility]  Cari Parks informed this Writer that will not be approved the Patient going to a SNF. Cari Parks is requesting more descriptive notes (measurements) on the Patient's wound.  This Writer informed Cari Parks that I would

## 2017-08-24 NOTE — PROGRESS NOTES
[facility] dosing service    Follow-up Pharmacokinetic Consult for Vancomycin Dosing     Lucas Berkowitz is a 50year old female admitted on 8/17 who is being treated for sepsis. Patient is on day 8 of vancomycin, recently held due to high through.    [facility] on Random level of 13 ug/mL, pharmacokinetics, adjusted weight and renal function)    2. Pharmacy will re-check Vancomycin trough levels prior to 5th dose. Goal trough level 15-20 ug/mL.     3.  Pharmacy will need BUN/Scr daily while on Vancomycin to asse

## 2017-08-24 NOTE — PROGRESS NOTES
Pulmonary Progress Note      NAME: Velasquez [facility] - ROOM: 985/333-Y - MRN: [id number] - Age: 50year old - : 1965    Assessment/Plan:  1. Acute respiratory failure - intubated for airway protection. Extubated.  Resolved  - currently on RA, cont IS  2 respiratory distress. HEENT: Normocephalic atraumatic. Lips, mucosa, and tongue normal.  No thrush noted. Lungs: Diminished at the bases   Chest wall: port in place   Heart: Regular rate and rhythm, normal S1S2, no murmur.    Abdomen: soft, non-tender, n

## 2017-08-24 NOTE — CONSULTS
[place] [facility] - [facility]  Palliative Care Follow Up    Georgechucho Nascimento Patient Status:  Inpatient    1965 MRN [id number]   Location [facility] 4W/SW/SE Attending Carmen Quezada MD   Hosp Day # 7 PCP Osmani Mireles MD     Date of Consult ipratropium-albuterol (DUONEB) nebulizer solution 3 mL, 3 mL, Nebulization, Q6H PRN  •  aspirin EC EC tab 325 mg, 325 mg, Oral, Daily  •  metoprolol Tartrate (LOPRESSOR) 5 MG/5ML injection 5 mg, 5 mg, Intravenous, Q6H  •  Normal Saline Flush 0.9 % injectio 08/23/2017   TP 4.2 (L) 08/23/2017   AST 27 08/23/2017   ALT 35 08/23/2017   MG 1.8 08/24/2017   PHOS 3.2 01/09/2017   TROP 0.24 (HH) 08/18/2017       Imaging:        Objective:  Vital Signs:  Blood pressure 118/74, pulse 86, temperature 97.7 °F (36.5 °C), oncology    Eileen's gangrene  -Per ID and wound care    Anemia      Pain  -Controlled  -Continue Roxanol 5mg po Q 4 hrs prn, Tramadol prn.  May still use Morphine IVP prn if Roxanol not effective.  -She is not interested in long acting opioids for pain c

## 2017-08-24 NOTE — PROGRESS NOTES
[facility] AND [facility] Infectious Disease Progress Note    Lai Nascimento Patient Status:  Inpatient    1965 MRN [id number]   Location [facility] 4W/SW/SE Attending Kieran Kennedy MD   Hosp Day # 7 PCP Juliet Piña MD     Subjective:  Pt compl suspension 30 mL, 30 mL, Oral, Daily PRN  •  bisacodyl (DULCOLAX) rectal suppository 10 mg, 10 mg, Rectal, Daily PRN  •  FLEET ENEMA (FLEET) 7-19 GM/118ML enema 133 mL, 1 enema, Rectal, Once PRN  •  famoTIDine (PEPCID) injection 20 mg, 20 mg, Intravenous, extubated, off pressors  -unresponsive at [facility]  -etiology unclear  -cultures at OSH negative  -cultures here NG  -possible seizure with brain mets and recent carbapenem use  -CXR with LLL opacification   -on IV cefepime and vancomycin d#7  2.   Recent perire

## 2017-08-24 NOTE — PHYSICAL THERAPY NOTE
Attempted physical therapy treatment. Per RN Samara, patient is fatigued at this time as she had just received IVC filter placed and is taking a nap. Requested physical therapy return later. Will re-attempt as schedule permits.

## 2017-08-24 NOTE — WOUND PROGRESS NOTE
WOUND CARE NOTE      PLAN   Recommendations:  ID currently on consult  Dietary consult for recommendations for nutrition to optimize wound healing  Turn schedules  Specialty bed: First step overlay; pt refused now agreeable to Joselyn 68.   Heels elevated using indicates no known allergies.     Labs:     Lab Results  Component Value Date   WBC 7.6 08/24/2017   HGB 9.0 (L) 08/24/2017   HCT 27.3 (L) 08/24/2017   PLT 58 (L) 08/24/2017   CREATSERUM 0.49 (L) 08/24/2017   BUN 4 (L) 08/24/2017    (H) 08/24/2017   K

## 2017-08-24 NOTE — BRIEF PROCEDURE NOTE
[facility]  Procedure Note    Bg Nascimento Patient Status:  Inpatient    1965 MRN [id number]   Location [facility] Attending Shaheen Gee MD   Hosp Day # 7 PCP Daren Van MD     Procedure: Idania Paz

## 2017-08-24 NOTE — SLP NOTE
SPEECH DAILY NOTE - INPATIENT    Evaluation Date: 08/20/17    ASSESSMENT & PLAN   ASSESSMENT  Pt seen for swallowing therapy to monitor swallowing tolerance and train swallowing precautions. Pt positioned to a sitting posture in bed.   Pt's  present thick liquids without overt clinical signs of aspiration to 100% accuracy. GOAL MET    NEW GOAL 8/24/17:   The patient will tolerate mech soft ground solids consistency and thin liquids without overt signs or symptoms of aspiration with 100 % accuracy over

## 2017-08-25 NOTE — PLAN OF CARE
SKIN/TISSUE INTEGRITY - ADULT    • Incision(s), wounds(s) or drain site(s) healing without S/S of infection Not Progressing    WOUND TO BUTTOCKS IS NOT IMPROVING, WET TO DRY DRESSING EVERY SHIFT, PLACED ON A WIL MATTRESS, BRUISING TO ARMS AND LEGS, RED ARIK

## 2017-08-25 NOTE — PLAN OF CARE
Aubrei Parrish has been drowsy/resting in bed all day. Worked with PT, cleared for rehab per physical therapist. Pain is better after starting MS contin today and prn Roxanol. Wound care provided, repositioned as tolerated by patient. Vanco, cefepime given.

## 2017-08-25 NOTE — SLP NOTE
SPEECH DAILY NOTE - INPATIENT    Evaluation Date: 08/20/17    ASSESSMENT & PLAN   ASSESSMENT  Pt seen for swallowing therapy to monitor swallowing tolerance and train swallowing precautions.   Pt positioned almost upright, however, bed did not go fully upri The patient/family/caregiver will demonstrate understanding and implementation of aspiration precautions and swallow strategies independently over 2-3 session(s). Education provided on swallowing precautions.   Educated the pt on the need to sit upright

## 2017-08-25 NOTE — PROGRESS NOTES
[facility] Hospitalist Progress Note     CC: Hospital Follow up    PCP: Leopold Kehr, MD       Assessment/Plan:     Active Problems:    Altered mental status    Goals of care, counseling/discussion    Advance care planning    Ms. Nascimento is an unfortunate 51 yo F imaging  - intermittently using IV morphine, tramadol ordered  - roxanol started per palliative    Unresponsiveness- improved  - possibly due to seizure activity per OSH, seen by neuro at that time, had negative EEG  - started on prophylactic keppra, will to continue to follow patient while in house     Patient and/or patient's family given opportunity to ask questions and note understanding and agreeing with therapeutic plan as outlined     Alvaro Rabago MD  [facility] Hospitalist  Answering Service number: [phone]*   BUN  6*  4*  9   CREATSERUM  0.50  0.49*  0.69   GFRAA  >60  >60  >60   GFRNAA  >60  >60  >60   CA  6.3*  6.9*  6.9*   NA  144  145*  145*   K  3.5  3.5  3.5  3.5  3.6  3.6   CL  114*  113*  113*   CO2  25  29  27       Recent Labs   Lab  08/23/17

## 2017-08-25 NOTE — DISCHARGE PLANNING
SW received a call from the pt's insurance [employer] [phone] who stated the pt. Has been approved for subacute rehab, when stable for discharge. At this time family is still deciding on a facility.      [facility] ext [phone]

## 2017-08-25 NOTE — CONSULTS
[place] [facility] - [facility]  Palliative Care Follow Up    Bertha Nascimento Patient Status:  Inpatient    1965 MRN [id number]   Location [facility] 4W/SW/SE Attending Cami Navarro MD   Hosp Day # 8 PCP Randi Loza MD     Date of Consult aspirin EC EC tab 325 mg, 325 mg, Oral, Daily  •  metoprolol Tartrate (LOPRESSOR) 5 MG/5ML injection 5 mg, 5 mg, Intravenous, Q6H  •  Normal Saline Flush 0.9 % injection 3 mL, 3 mL, Intravenous, PRN  •  acetaminophen (TYLENOL) tab 650 mg, 650 mg, Oral, Q6H temperature source Oral, resp. rate 18, height 5' 5.75\" (1.67 m), weight 216 lb 12.8 oz (98.3 kg), SpO2 98 %. Body mass index is 35.26 kg/m².   Present Level of pain: moderate buttocks  Non-verbal signs of pain present: NO    Physical Exam:  General: Slee Added Senna 2 tabs po daily prn    Anxiety  -Controlled  -Continue Xanax prn    Goals of care, counseling/discussion  -Plan for rehab with community palliative care by [facility] upon dc.  Not ready for hospice.  -Pt is DNR, DNI, no g-tube and continue sup

## 2017-08-25 NOTE — OCCUPATIONAL THERAPY NOTE
OCCUPATIONAL THERAPY TREATMENT NOTE - INPATIENT     Room Number: 457/457-A    Presenting Problem: ca w/ mets to bone,brain    Problem List  Active Problems:    Altered mental status    Goals of care, counseling/discussion    Advance care planning    Past M urinal? : Total  -   Putting on and taking off regular upper body clothing?: Total  -   Taking care of personal grooming such as brushing teeth?: A Lot  -   Eating meals?: A Lot    AM-PAC Score:  Score: 8  Approx Degree of Impairment: 85.69%  Standardized required max a to initiate srom r elbow x 5 reps    Patient will tolerate sitting eob 3 minutes with mod assist   Comment:pt tolerated sitting position in bed for duration of session;pt remaining upright for lunch       Goals  on:  17  Frequency:

## 2017-08-25 NOTE — PROGRESS NOTES
Zion Dee is a 50year old female. No chief complaint on file. HPI:    Asleep; chronically ill    REVIEW OF SYSTEMS:   A comprehensive 11 point review of systems was completed. Pertinent positives and negatives noted in the the HPI.     Allergie clubbing, no cyanosis. NEURO:  No focal neurologic deficits. DERM:  Warm, dry, no rashes. IV Site: ok      IMPRESSION/PLAN:   Assessment/Plan:     1.   Septic shock  -resolving: extubated, off pressors  -unresponsive at [facility]  -etiology unclear  -cultures (H) 10.0 - 20.0   Calculated Osmolality 281 275 - 295 mOsm/kg   GFR, Non-African American >60 >=60   GFR, -American >60 >=60   -PROTHROMBIN TIME (PT)   Result Value Ref Range   PT 14.3 11.8 - 14.5 seconds   INR 1.2 0.9 - 1.2   -TROPONIN I   Result V Negative   Nitrite Urine Negative Negative   Urobilinogen Urine <2.0 <2.0   Leukocyte Esterase Urine Moderate (A) Negative   Ascorbic Acid Urine Negative Negative mg/dL   WBC Urine 30 (H) 0 - 5 /HPF   RBC URINE 23 (H) 0 - 3 /HPF   Hyphae Yeast Few (A) None Calcium, Total 5.7 (LL) 8.5 - 10.5 mg/dL   BUN/CREA Ratio 21.4 (H) 10.0 - 20.0   Anion Gap 6 0 - 18 mmol/L   Calculated Osmolality 293 275 - 295 mOsm/kg   GFR, Non-African American >60 >=60   GFR, -American >60 >=60   -CBC, PLATELET; NO DIFFERENTI 10.5 mg/dL   BUN/CREA Ratio 13.6 10.0 - 20.0   Anion Gap 3 0 - 18 mmol/L   Calculated Osmolality 295 275 - 295 mOsm/kg   GFR, Non-African American >60 >=60   GFR, -American >60 >=60   -CBC, PLATELET; NO DIFFERENTIAL   Result Value Ref Range   WBC 9. Ref Range   Glucose 108 (H) 70 - 99 mg/dL   Sodium 145 (H) 136 - 144 mmol/L   Potassium 3.5 3.3 - 5.1 mmol/L   Chloride 113 (H) 95 - 110 mmol/L   CO2 29 22 - 32 mmol/L   BUN 4 (L) 8 - 20 mg/dL   Creatinine 0.49 (L) 0.50 - 1.50 mg/dL   Calcium, Total 6.9 (L Neutrophil % 92 %   Lymphocyte % 3 %   Monocyte % 5 %   Eosinophil % 0 %   Basophil % 0 %   Neutrophil Absolute 8.3 (H) 1.8 - 7.7 K/UL   Lymphocyte Absolute 0.2 (L) 1.0 - 4.0 K/UL   Monocyte Absolute 0.5 0.0 - 1.0 K/UL   Eosinophil Absolute 0.0 0.0 - 0.7 Eosinophil % 0 %   Basophil % 0 %   Neutrophil Absolute 9.2 (H) 1.8 - 7.7 K/UL   Lymphocyte Absolute 0.3 (L) 1.0 - 4.0 K/UL   Monocyte Absolute 0.6 0.0 - 1.0 K/UL   Eosinophil Absolute 0.0 0.0 - 0.7 K/UL   Basophil Absolute 0.0 0.0 - 0.2 K/UL

## 2017-08-25 NOTE — PHYSICAL THERAPY NOTE
PHYSICAL THERAPY TREATMENT NOTE - INPATIENT    Room Number: 457/457-A       Presenting Problem:  (weakness, confusion)    Problem List  Active Problems:    Altered mental status    Goals of care, counseling/discussion    Advance care planning      ASSESSM reeducation)    SUBJECTIVE  Non verbal nodding yeas or no, no current c/o pain with PT mobility and treatment     OBJECTIVE  Precautions: Limb alert - right    WEIGHT BEARING RESTRICTION  Weight Bearing Restriction: R upper extremity  R Upper Extremity:  ( slides  SLR     Position Sitting       Patient End of Session: In bed; With [facility] staff;Needs met;Call light within reach;SCDs in place;RN aware of session/findings; All patient questions and concerns addressed; Family present; Alarm set    CURRENT GOALS     Goal

## 2017-08-26 NOTE — PLAN OF CARE
Patient pain well managed with current medications roxanol and ms contin. Patient refused dressing change and repositioning. Quezada flushed and irrigated due to sediment, no leakage noted. No acute changes over night.

## 2017-08-26 NOTE — PROGRESS NOTES
[facility] Hospitalist Progress Note     CC: Hospital Follow up    PCP: Osmani Mireles MD       Assessment/Plan:     Active Problems:    Altered mental status    Goals of care, counseling/discussion    Advance care planning    Ms. Nascimento is an unfortunate 49 yo F using IV morphine, tramadol ordered  - roxanol started per palliative    Unresponsiveness- improved  - possibly due to seizure activity per OSH, seen by neuro at that time, had negative EEG  - started on prophylactic keppra, will continue  - CT with known [facility] hospitalist to continue to follow patient while in house     Patient and/or patient's family given opportunity to ask questions and note understanding and agreeing with therapeutic plan as outlined     Dawood Mora MD  [facility] Hospitalist  Answering Service n 08/26/17   0510   GLU  108*  153*  120*   BUN  4*  9  7*   CREATSERUM  0.49*  0.69  0.59   GFRAA  >60  >60  >60   GFRNAA  >60  >60  >60   CA  6.9*  6.9*  6.9*   NA  145*  145*  144   K  3.5  3.5  3.6  3.6  3.2*   CL  113*  113*  112*   CO2  29  27  28

## 2017-08-26 NOTE — PROGRESS NOTES
[facility] AND [facility] Infectious Disease Progress Note    Doug Nascimento Patient Status:  Inpatient    1965 MRN [id number]   Location [facility] 4W/SW/SE Attending Sameer De Oliveira MD   Hosp Day # 9 PCP Claribel Grady MD     Subjective:  Pt Jenn Javed Cefepime HCl (MAXIPIME) 1 g in sodium chloride 0.9% 100 mL IVPB-MBP, 1 g, Intravenous, Q8H  •  levETIRAcetam (KEPPRA) 500 mg in sodium chloride 0.9 % 100 mL IVPB, 500 mg, Intravenous, Q12H    Physical Exam:  General: Alert, weak. Cooperative.   No apparent necrosis   -s/p IV meropenem  -penrose in place  3. Metastatic lung CA  -MRI with CNS mets  -palliative on consult  4.   Dispo  -continue with abx above  -transition to PO levofloxacin on d/c, script in chart    If you have any questions or concerns please

## 2017-08-27 NOTE — DISCHARGE PLANNING
8/27/27 CM Discharge planning   Spoke with Mirta Wilcox at [facility] can not confirm Ins authorization at this time. They will contact Ins in am, bed available tomorrow 8/28pending Ins auth.   Spoke with  Cassidy Tee cell [phone]

## 2017-08-27 NOTE — SLP NOTE
SPEECH DAILY NOTE - INPATIENT    Evaluation Date: 08/20/17    ASSESSMENT & PLAN   ASSESSMENT  Pt seen for swallowing therapy to monitor swallowing tolerance and train swallowing precautions.   RN reports pt has been fed for meals and is tolerating diet with Communication: Collaborate with RN. RN reports no coughing at meals.     GOALS  Goal #1 The patient will tolerate mech soft ground solids consistency and nectar thick liquids without overt signs or symptoms of aspiration with 100 % accuracy over 2-3 sessio upgrade at this time to solids. GOAL PROGRESSING     FOLLOW UP  Follow Up Needed: Yes  SLP Follow-up Date: 08/26/17  Number of Visits to Meet Established Goals: 7  Session: 4 post eval 8/20/17    If you have any questions, please contact    Davin Doss'KELSEA Madrid.  [place]

## 2017-08-27 NOTE — PROGRESS NOTES
[facility] Hospitalist Progress Note     CC: Hospital Follow up    PCP: Niki Oh MD       Assessment/Plan:     Active Problems:    Altered mental status    Goals of care, counseling/discussion    Advance care planning    Ms. Nascimento is an unfortunate 51 yo F using IV morphine, tramadol ordered  - roxanol started per palliative    Unresponsiveness- improved  - possibly due to seizure activity per OSH, seen by neuro at that time, had negative EEG  - started on prophylactic keppra, will continue  - CT with known [facility] hospitalist to continue to follow patient while in house     Patient and/or patient's family given opportunity to ask questions and note understanding and agreeing with therapeutic plan as outlined     Cayla Greenberg MD  [facility] Hospitalist  Answering Service n CREATSERUM  0.69  0.59   --    GFRAA  >60  >60   --    GFRNAA  >60  >60   --    CA  6.9*  6.9*   --    NA  145*  144   --    K  3.6  3.6  3.2*  4.1   CL  113*  112*   --    CO2  27  28   --        No results for input(s): ALT, AST, ALB, AMYLASE, LIPASE,

## 2017-08-27 NOTE — PROGRESS NOTES
[facility] AND [facility] Infectious Disease  Progress Note    Bertha Nascimento Patient Status:  Inpatient    1965 MRN [id number]   Location [facility] 4W/SW/SE Attending Erik Altman MD   Hosp Day # 10 PCP Randi Loza MD     Subjective:  Glorious Ortiz appearance.  This is, however, considered significantly less likely given provided history.     Assessment and Plan:     1.  Sepsis/septic shock with unresponsive state at [facility] - improved, closer to baseline  - Source [facility]

## 2017-08-28 NOTE — DISCHARGE PLANNING
8/28[facility] has insurance approval and is able to accept the Patient  today (8/28) for transfer at 5:00p.m. This Writer informed the Patient's RN of the above. The Patient's is disoriented and unable to sit unattended.   The Patient's R

## 2017-08-28 NOTE — DISCHARGE SUMMARY
General Medicine Discharge Summary     Patient ID:  Ping Nascimento  50year old  8/12/1965    Admit date: 8/17/2017    Discharge date and time: 8/28/17    Attending Physician: Elmyra Nageotte, MD     Consults: IP CONSULT TO CARDIOLOGY  IP CONSULT TO SOCIAL WORK hemoptysis and acute drop in Hg, now only on prophylactic hep subQ due. Also with respiratory failure s/p intubation. Patient now extubated and transferred to [facility] due to out of network at [facility].  Neuro and ID following patient at OSH, EEG negative, plan saddle anesthesia at this time     Septic shock - resolved  - presented to OSH with septic shock, unknown source, was on vasopressors, now weaned off, vanc/cefepime, cultures negative to date, also stress dose steroids  - completed IV antibiotics  - levaqu who  Agree to DNR    - family and patient want to continue current measures, decline hospice           Operative Procedures:      Imaging:         Disposition: home    Activity: activity as tolerated  Diet: regular diet  Wound Care: as directed twice daily 108 (90 Base) MCG/ACT Aers  Commonly known as:  PROAIR HFA  Inhale 2 puffs into the lungs every 6 (six) hours as needed for Wheezing.      ALPRAZolam 0.5 MG Tabs  Commonly known as:  XANAX  Take 1 tablet (0.5 mg total) by mouth every 4 (four) hours as neede weeks.    Specialty:  Internal Medicine  Contact information:  [address]  [address] [place] [phone]             Damon Castle MD. Schedule an appointment as soon as possible for a visit in 1 week.     Specialties:  Mathew Sood

## 2017-08-28 NOTE — PROGRESS NOTES
[facility] AND [facility] Infectious Disease Progress Note    Naty Nascimento Patient Status:  Inpatient    1965 MRN [id number]   Location [facility] 4W/SW/SE Attending Nellie Barfield MD   Hosp Day # 6 PCP Christy Barba MD     Subjective:  Patient Q12H    Physical Exam:  General: Alert, orientated x3. Cooperative. No apparent distress. Vital Signs:  Blood pressure 124/80, pulse 86, temperature 97.8 °F (36.6 °C), temperature source Oral, resp. rate 18, height 5' 5.75\" (1.67 m), weight 216 lb 12. 8 in place  - Unlikely that this is the source of sepsis     3.  Metastatic small cell lung cancer with MRI showing CNS mets  - Supportive care, prognosis unfortunately is poor given advanced disease  - Agree with palliative care, DNR/DNI  - Family declines

## 2017-08-28 NOTE — CONSULTS
[facility]  Palliative Care Follow Up    Yara Nascimento Patient Status:  Inpatient    1965 MRN [id number]   Location [facility] 4W/SW/SE Attending Silvia Parra MD   Hosp Day # 6 PCP Sammie Del Cid MD     Date of [facility] 3 mL, Nebulization, Q6H PRN  •  metoprolol Tartrate (LOPRESSOR) 5 MG/5ML injection 5 mg, 5 mg, Intravenous, Q6H  •  Normal Saline Flush 0.9 % injection 3 mL, 3 mL, Intravenous, PRN  •  acetaminophen (TYLENOL) tab 650 mg, 650 mg, Oral, Q6H PRN  •  ondansetr levofloxacin 750 MG Oral Tab Take 1 tablet (750 mg total) by mouth daily.        Hematology:    Lab Results  Component Value Date   WBC 6.1 08/26/2017   HGB 8.5 (L) 08/26/2017   HCT 25.8 (L) 08/26/2017   PLT 64 (L) 08/26/2017       Coags:    Lab Results chart  Type of Healthcare Directive: Durable power of  for health care; Health care treatment directive  Healthcare Agent Appointed: No        Pre-existing DNR/DNI Order: Yes, notify physician for order  Describe Patient Wishes: DNR,DNI, no g-tube, visit with Rik Terry RN    I will sign off as dc is anticipated today.       HERNANDEZ Grodon, [facility] [id number]  8/28/2017  3:30 PM

## 2017-08-28 NOTE — PROGRESS NOTES
Report Inder Rodriguez RN at [employer]. All belongings sent with patient. [facility] ambulance  patient. Leaving in stable condition to DC as per MD.  at bedside.

## 2017-08-28 NOTE — PHYSICAL THERAPY NOTE
PHYSICAL THERAPY TREATMENT NOTE - INPATIENT    Room Number: 457/457-A       Presenting Problem:  (weakness, confusion)    Problem List  Active Problems:    Altered mental status    Goals of care, counseling/discussion    Advance care planning    Anxiety chair with arms (e.g., wheelchair, bedside commode, etc.): Unable   -   Moving from lying on back to sitting on the side of the bed?: Unable   How much help from another person does the patient currently need. ..   -   Moving to and from a bed to a chair (i

## 2017-08-31 PROBLEM — Z51.5 END OF LIFE CARE: Status: ACTIVE | Noted: 2017-01-01

## 2017-08-31 PROBLEM — J96.90 RESPIRATORY FAILURE, UNSPECIFIED CHRONICITY, UNSPECIFIED WHETHER WITH HYPOXIA OR HYPERCAPNIA (HCC): Status: ACTIVE | Noted: 2017-01-01

## 2017-08-31 PROBLEM — C34.90 PRIMARY MALIGNANT NEOPLASM OF LUNG METASTATIC TO OTHER SITE (HCC): Status: ACTIVE | Noted: 2017-01-01

## 2017-08-31 PROBLEM — C50.919 BREAST CANCER METASTASIZED TO BRAIN (HCC): Status: ACTIVE | Noted: 2017-01-01

## 2017-08-31 PROBLEM — C34.90 PRIMARY MALIGNANT NEOPLASM OF LUNG METASTATIC TO OTHER SITE, UNSPECIFIED LATERALITY (HCC): Status: ACTIVE | Noted: 2017-01-01

## 2017-08-31 PROBLEM — J96.90 RESPIRATORY FAILURE (HCC): Status: ACTIVE | Noted: 2017-01-01

## 2017-08-31 PROBLEM — R41.82 ALTERED MENTAL STATUS, UNSPECIFIED ALTERED MENTAL STATUS TYPE: Status: ACTIVE | Noted: 2017-01-01

## 2017-08-31 NOTE — DISCHARGE PLANNING
Hospice referral made in all scripts via 4th floor ISR. Hospice Liaison with [facility] also notified.

## 2017-08-31 NOTE — PLAN OF CARE
Pt received from ED with comfort measures in place. Admitted to in-patient hospice. Lechuga in place on arrival, but not draining. Catheter fell out of pt during irrigation. New lechuga placed. Draining deny cloudy urine. Apneic breathing.  Morphine drip start

## 2017-08-31 NOTE — DISCHARGE PLANNING
8/31CM-MD orders received in regards to hospice Residential inpt hospice evaluation today please . Campos Lomeli This Writer made a referral to [facility] ([phone]) and sent out referral documents. This Writer informed the Patient's RN of the above.     -

## 2017-08-31 NOTE — ED INITIAL ASSESSMENT (HPI)
Pt arrived via medics tor m 39 with 15L NRB 02, 22g left hand sl and lechuga for complaint of sob and unresponsiveness.   Pt has valid DNR, from [employer], pt not responding to verbal or painful stimuli, dr Salima Choudhury at bedside

## 2017-08-31 NOTE — HOSPICE RN NOTE
Residential hospice met with POA/  Beverli Burkitt and pts sister Kenya Peters. Hospice discussed. Consents signed Pt admitted GIP for respiratory  Distress. DX breast cancer with mets. Spoke with nurse Nessa   Pt started on a Morphine gtt 1 mg/hr.

## 2017-08-31 NOTE — CONSULTS
[facility] Patient Status:  Emergency    1965 MRN [id number]   Location [address] Attending Kateryna Lopez MD   Hosp Day # 0 PCP Beatriz Dunham MD minimally responsive, opens open briefly to verbal command. She is on 15L by mask, sats 100%, RR 28 with use of accessory muscles. No signs of terminal agitation. Tachycardic 120's, +febrile.   Appetite has been overall poor at rehab the past few days per h COLONOSCOPY, POSSIBLE BIOPSY,                POSSIBLE POLYPECTOMY 97076;  Surgeon: Shauna Aguiar MD;  [facility] OTHER SURGICAL HISTORY Right      Comment: shoulder (snowmobiling)  1995: OTH Oral Solution Take 0.3 mL (6 mg total) by mouth every 4 (four) hours as needed. morphINE Sulfate ER 15 MG Oral Tab CR Take 1 tablet (15 mg total) by mouth every 12 (twelve) hours.    ALPRAZolam 0.5 MG Oral Tab Take 1 tablet (0.5 mg total) by mouth every 4 4.5 08/31/2017    08/31/2017   CO2 29 08/31/2017    (H) 08/31/2017   CA 6.8 (L) 08/31/2017   ALB 2.1 (L) 08/23/2017   ALKPHO 53 08/23/2017   BILT 1.2 08/23/2017   TP 4.2 (L) 08/23/2017   AST 27 08/23/2017   ALT 35 08/23/2017   MG 1.9 08/26/201 unless for comfort    Procedures:  No intubation  No g-tube  No hemodialysis  No further blood draws/diagnostics    Medications:  DC all non-comfort meds  No Vasopressors    Assessment/Recommendations:  Respiratory failure (HCC)  -Morphine 2mg IVP X1 now

## 2017-09-01 NOTE — PLAN OF CARE
PAIN - ADULT    • Verbalizes/displays adequate comfort level or patient's stated pain goal Progressing        SAFETY ADULT - FALL    • Free from fall injury Progressing          Pt resting in bed. Morphine drip increased to 3ml/hr.  Robinul, lasix and atrop

## 2017-09-01 NOTE — CM/SW NOTE
JACINTO barber 9/1/17  9:20am-9:46am  MSW met with pt's sister at pt's bedside. As per FN, the pt had had a bad night. She was sleeping peacefully at time if this writer's visit. MSW provided supportive presence, contact info.  FH info is not known by the pt's  s

## 2017-09-01 NOTE — H&P
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     Acute resp failure  Acute encephalopathy  Metastatic small cell l membrane. NECK: unable to eval  RESPIRATORY: normal expansion; labored. On 15 L facemask  CARDIOVASCULAR: tachycardic. no murmur, no gallop; no rub   ABDOMEN:  Soft, ND. +BS   PSYCH: unresponsive  EXTREMITIES: no edema.       PMH  Past Medical History:   D ALT, AST, ALB, AMYLASE, LIPASE, LDH in the last 72 hours. Invalid input(s): ALPHOS, TBIL, DBIL, TPROT    No results for input(s): TROP in the last 72 hours.

## 2017-09-01 NOTE — PROGRESS NOTES
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

## 2017-09-01 NOTE — SPIRITUAL CARE NOTE
Patient's  and patient's sister were both present, both were very appreciative of . Patient's  was very kind and very social, talked a lot about his family. Active listening and prayers provided.

## 2017-09-01 NOTE — HOSPICE RN NOTE
Residential hospice visit. Pt in bed sister at bedside pt non resposive labored resp at 22 using accessory muscles, congestion audible in lungs breath sounds absent in bases. Color pale oxygen on per NRB Morphine infusing at 3 mg/hr.  Scopolamine patch is p

## 2017-09-01 NOTE — ED PROVIDER NOTES
Patient Seen in: [facility] 4w/sw/se    History   Patient presents with:  Altered Mental Status (neurologic)    Stated Complaint: sob/unresponsive - valid dnr    HPI    Patient presents the emergency department short of breath and unresponsive fro mouth 2 (two) times daily. Metoprolol Succinate ER 25 MG Oral Tablet 24 Hr,  Take 1 tablet (25 mg total) by mouth daily. acetaminophen 325 MG Oral Tab,  Take 2 tablets (650 mg total) by mouth every 6 (six) hours as needed.    HydrOXYzine HCl 25 MG Oral Pulse 122   Temp 100.6 °F (38.1 °C) (Axillary)   Resp 22   LMP  (Within Years)   SpO2 96%         Physical Exam   Constitutional: She appears toxic. She has a sickly appearance. She appears distressed. HENT:   Head: Normocephalic.    Eyes: Conjunctivae an individual orders.    RAINBOW DRAW BLUE   RAINBOW DRAW GOLD   RAINBOW DRAW LAVENDER   RAINBOW DRAW LIGHT GREEN   RAINBOW DRAW DARK GREEN   RAINBOW DRAW LAVENDER TALL (BNP)              ============================================================  ED Course

## 2017-09-02 NOTE — HOSPICE RN NOTE
Hospice inpatient visit    Received patient in bed. Patient is unresponsive at this time. Patient does not appear to be in any pain or distress. Morphine drip is at 5mg/hr and is currently effective. Patient appears comfortable but imminent at this time.  C

## 2017-09-02 NOTE — DISCHARGE SUMMARY
General Medicine Discharge Summary     Patient ID:  Hever Nascimento  50year old  8/12/1965    Admit date: 8/31/2017    Discharge date and time: 9/2/17 @1447    Attending Physician: Mainor Jeronimo MD     Consults: IP CONSULT TO SPIRITUAL CARE  IP CONSULT TO SPIR

## 2017-09-02 NOTE — SPIRITUAL CARE NOTE
SD     Pt.  at 2:47 pm. Significant other was  POA. Daughter of the  was also by mother's side and weeping.

## (undated) NOTE — IP AVS SNAPSHOT
Patient Demographics     Address  [address] [facility]. [place] [address] Phone  [phone] [facility]  [phone] [facility] E-mail Address  [email or web address]      Emergency Contact(s)     Name Relation Home Work [address] [place] [address]   [phone] prescribed for you. Read the directions carefully, and ask your doctor or other care provider to review them with you.             CHANGE how you take these medications    Prochlorperazine Maleate 10 mg tablet  Commonly known as:  COMPAZINE  On Day 2 and 3 Phone:  [phone]   · Metoprolol Succinate ER 25 MG Tb24  · nystatin 023748 UNIT/GM Crea  · sennosides 8.6 MG Tabs     You can get these medications from any pharmacy    Bring a paper prescription for each of these medications  · acetaminophen 325 MG T levofloxacin 750 MG Tabs  Commonly known as:  LEVAQUIN      Take 1 tablet (750 mg total) by mouth daily. Stop taking on:  8/31/2017   Deloris Harris NP         Metoprolol Succinate ER 25 MG Tb24  Commonly known as:   Toprol XL      Take 1 tablet (25 mg total) Bring a paper prescription for each of these medications  acetaminophen 325 MG Tabs  ALPRAZolam 0.5 MG Tabs  levETIRAcetam 500 MG Tabs  levofloxacin 750 MG Tabs  morphINE Sulfate (Concentrate) 20 MG/ML Soln  morphINE Sulfate ER 15 MG Tbcr  TraMADol HCl 50 [id number] morphINE Sulfate ER (MS CONTIN) CR tab 15 mg 08/28/17 0906 Given      [id number] nystatin (MYCOSTATIN) 292524 UNIT/GM cream 08/27/17 2048 Given      [id number] nystatin (MYCOSTATIN) 085675 UNIT/GM cream 08/28/17 0908 Given      [id number] sodium ch MRSA Screen By PCR Negative         H&P - H&P Note      H&P signed by Reuben Ambriz MD at 8/17/2017  7:40 PM  Version 1 of 1    Author:  Reuben Ambriz MD Service:  Hospitalist Author Type:  Physician    Filed:  8/17/2017  7:40 PM Date of Servic Metastatic small cell lung cancer  - widely metastatic to brain and encasing pulmonary artery  - onc consult; was on decadron; follow-up radiation oncology  - s/p WBRT 11/2016- 12/2016; had repeat PET/CT and MRI brain in July 2017, markedly rapid increase hemoptysis and acute drop in Hg, now[GV.2] only on prophylactic hep subQ due[GV.1]. [GV.2] Also with respiratory[GV.1] failure[GV.2] s/p intubation. Patient now extubated and transferred to [facility] due to out of network at [facility]. [GV.1] Neuro and ID followin HEENT: EOMI, PERRLA  Neck: Supple, no JVD  Pulm: CTAB, no crackles or wheezes  CV: RRR, no murmurs, 2+ peripheral pulses  ABD: Soft, non-tender, non-distended, +BS  Neuro: Grossly normal, CN intact, sensory intact  Psych: Affect- normal  SKIN: warm, dry[GV GV.2 Rae Mckeon MD on 8/17/2017  7:29 PM  GV. 3 - Griffin Mcnulty MD on 8/17/2017  5:55 PM  GV. 4 - Griffin Mcnulty MD on 8/17/2017  6:16 PM                        Consults - MD Consult Notes      Consults signed by Flora Crowley MD at 8/19/ transferred to [facility] for insurance reasons. A Troponin was checked and repeated, each in the 0.2 and 0.3 range. This is the reason for my consult. EKG normal and she denies chest pain, SOB or h/o cardiac disease. Palliative has been consulted. •  acetaminophen (TYLENOL) tab 650 mg, 650 mg, Oral, Q6H PRN  •  ondansetron HCl (ZOFRAN) injection 4 mg, 4 mg, Intravenous, Q6H PRN  •  Normal Saline Flush 0.9 % injection 3 mL, 3 mL, Intravenous, PRN  •  morphINE sulfate (PF) 2 MG/ML injection 1 mg, 1 mg Neurologic: Alert and oriented, normal affect. Skin: Warm and dry. Laboratories and Data:    Diagnostics:  Telemetry: NSR  EKG: NSR, NS T wave abn. No ST abn. No old EKG for comparison.     CXR: Slight decrease in the amount of left perihilar and lef Advance care planning      ASSESSMENT   Chart reviewed[KS. 1]    RN contacting department for therapy visit today[KS. 2]        RN approve participation[KS. 1] ----per RN[KS. 2]   MD requesting therapy visit today if possible  For possible transfer to rehab reposition. [KS.3]      Gentle PROM for right LE and right UE with respect for lines  . Encouraged pt for left ankle pumps . Pt using left UE throughout session. PT will continue to follow this admission progressing as approp.   D/c plans pending progr Static Standing: Not tested  Dynamic Standing: Not tested    ACTIVITY TOLERANCE[KS. 1]  Resting /85  HR  84 O2 sats 99 %     After activity  BP   123/83  HR   111  o2 sats  98 %  RA[KS. 5]     AM-PAC '6-Clicks' [address] [place] Inocencia José, PT on 8/27/2017  6:58 PM  KS. 5 - Inocencia José, PT on 8/27/2017  7:23 PM  KS. 6 - Inocencia José, PT on 8/27/2017  7:07 PM  KS. 7 - Inocencia José, PT on 8/27/2017  7:26 PM                     Occupatio apple juice and then took 5 drinks of water from a cup. Pt followed small sips with no other overt clinical signs of aspiration. Will continue to closely monitor tolerance of PO diet and train swallowing strategies.   If continued signs of aspiration, rec implementation of aspiration precautions and swallow strategies independently over 2-3 session(s). Education provided to pt and her  on swallowing precautions.   Educated the pt on the need to sit upright for all po and take small single sips at

## (undated) NOTE — LETTER
[address], [place]  Authorization for Invasive Procedures  1.  I hereby authorize Dr. Enma Wade , my physician and whomever may be designated as the doctor's assistant, to perform the following operation and/or procedure:  Inferior Ve performed for the purposes of advancing medicine, science, and/or education, provided my identity is not revealed. If the procedure has been videotaped, the physician/surgeon will obtain the original videotape.  The hospital will not be responsible for stor My signature below affirms that prior to the time of the procedure, I have explained to the patient and/or her legal representative, the risks and benefits involved in the proposed treatment and any reasonable alternative to the proposed treatment.  I have

## (undated) NOTE — MR AVS SNAPSHOT
Kenna Umanzor   2017 9:30 AM   Office Visit   MRN:  [id number]    Description:  Female : 1965   Department:  [address] [place] [address]              Visit Summary      Primary Visit Diagnosis     Non-small cell carcinom Tuesday June 13, 2017 12:00 PM     Appointment with [address] [employer] [facility] at [address] ([phone])   [facility] [address] Wednesday June 14, 2017 4:00 PM     Appointment with [address] [employer]

## (undated) NOTE — LETTER
[address]  INFORMED CONSENT FOR TRANSFUSION OF BLOOD OR BLOOD PRODUCTS   My physician has informed me of the nature, purpose, benefits and risks of transfusion for blood and blood components that he/she may deem nece (Signature of Patient)                                       (Responsible party in case of Minor,                                                                            Incompetent, or unconscious Patient)  __________________________________    _______

## (undated) NOTE — IP AVS SNAPSHOT
[facility]            (For Outpatient Use Only) Initial Admit Date: 8/17/2017   Inpt/Obs Admit Date: Inpt: 8/17/17 / Obs: N/A   Discharge Date:    Nikko Duran:  [de-identified]   MRN: [de-identified]   CSN: [id number]        ENCOUNTER  Patient Class Hospital Account Financial Class: [facility]    August 28, 2017